# Patient Record
Sex: FEMALE | Race: WHITE | Employment: OTHER | URBAN - METROPOLITAN AREA
[De-identification: names, ages, dates, MRNs, and addresses within clinical notes are randomized per-mention and may not be internally consistent; named-entity substitution may affect disease eponyms.]

---

## 2017-05-11 ENCOUNTER — HOSPITAL ENCOUNTER (OUTPATIENT)
Dept: LAB | Age: 77
Discharge: HOME | End: 2017-05-11

## 2017-05-11 PROCEDURE — 88305 TISSUE EXAM BY PATHOLOGIST: CPT | Performed by: INTERNAL MEDICINE

## 2017-09-05 PROBLEM — R60.0 LOCALIZED EDEMA: Status: ACTIVE | Noted: 2017-09-05

## 2017-09-05 PROBLEM — R06.02 SHORTNESS OF BREATH: Status: ACTIVE | Noted: 2017-09-05

## 2017-11-22 PROBLEM — S02.2XXA CLOSED FRACTURE OF NASAL BONE: Status: ACTIVE | Noted: 2017-06-21

## 2017-11-22 PROBLEM — R06.02 SHORTNESS OF BREATH: Status: RESOLVED | Noted: 2017-09-05 | Resolved: 2017-11-22

## 2017-11-22 PROBLEM — R60.0 LOCALIZED EDEMA: Status: RESOLVED | Noted: 2017-09-05 | Resolved: 2017-11-22

## 2017-11-22 PROBLEM — S02.2XXA CLOSED FRACTURE OF NASAL BONE: Status: RESOLVED | Noted: 2017-06-21 | Resolved: 2017-11-22

## 2018-08-22 ENCOUNTER — HOSPITAL ENCOUNTER (OUTPATIENT)
Dept: LAB | Age: 78
Discharge: HOME | End: 2018-08-22

## 2018-08-22 PROCEDURE — 88305 TISSUE EXAM BY PATHOLOGIST: CPT

## 2019-05-30 PROBLEM — E04.2 MULTINODULAR GOITER: Status: ACTIVE | Noted: 2019-05-30

## 2019-07-25 PROBLEM — E78.00 HYPERCHOLESTEROLEMIA: Status: ACTIVE | Noted: 2018-03-05

## 2019-07-25 PROBLEM — I10 HYPERTENSIVE DISORDER: Status: ACTIVE | Noted: 2018-03-05

## 2019-08-28 DIAGNOSIS — E04.2 MULTINODULAR GOITER: Primary | ICD-10-CM

## 2019-09-03 ENCOUNTER — HOSPITAL ENCOUNTER (OUTPATIENT)
Dept: SURGERY | Age: 79
Discharge: HOME | End: 2019-09-03

## 2019-09-03 VITALS — WEIGHT: 149 LBS | HEIGHT: 65 IN | BODY MASS INDEX: 24.83 KG/M2

## 2019-09-03 NOTE — PERIOP NOTES
Patient verified name and . Order for consent found in EHR and matches case posting; patient verifies procedure. Type 2 surgery, phone assessment complete. Orders received. Labs per surgeon: none needed. Labs per anesthesia protocol: HGB, K+, Creatinine. Had BMP & CBC drawn on 19; results within anesthesia protocols and on chart for reference if needed. EKG: not needed per anesthesia protocols. [facility] cardiology office note (10/19/17), echo (17), ekg (17), and stress test (17) available in EHR for reference if needed. Patient answered medical/surgical history questions at their best of ability. All prior to admission medications documented in [facility]. Patient instructed to take the following medications the day of surgery according to anesthesia guidelines with a small sip of water: 81 mg aspirin, benzonatate, nexium. Hold all vitamins 7 days prior to surgery and NSAIDS 5 days prior to surgery. Prescription meds to hold: none. Instructed to bring dos: nexium, lovastatin. Patient instructed on the following:  Arrive at A Entrance, time of arrival to be called the day before by 1700  NPO after midnight including gum, mints, and ice chips  Responsible adult must drive patient to the hospital, stay during surgery, and patient will need supervision 24 hours after anesthesia  Use Hibiclens in shower the night before surgery and on the morning of surgery  All piercings must be removed prior to arrival.    Leave all valuables (money and jewelry) at home but bring insurance card and ID on DOS. Do not wear make-up, nail polish, lotions, cologne, perfumes, powders, or oil on skin. Patient teach back successful and patient demonstrates knowledge of instruction.

## 2019-09-08 ENCOUNTER — ANESTHESIA EVENT (OUTPATIENT)
Dept: SURGERY | Age: 79
End: 2019-09-08
Payer: MEDICARE

## 2019-09-08 RX ORDER — OXYCODONE HYDROCHLORIDE 5 MG/1
5 TABLET ORAL
Status: CANCELLED | OUTPATIENT
Start: 2019-09-08 | End: 2019-09-09

## 2019-09-08 RX ORDER — HYDROMORPHONE HYDROCHLORIDE 2 MG/ML
0.5 INJECTION, SOLUTION INTRAMUSCULAR; INTRAVENOUS; SUBCUTANEOUS
Status: CANCELLED | OUTPATIENT
Start: 2019-09-08

## 2019-09-08 RX ORDER — SODIUM CHLORIDE, SODIUM LACTATE, POTASSIUM CHLORIDE, CALCIUM CHLORIDE 600; 310; 30; 20 MG/100ML; MG/100ML; MG/100ML; MG/100ML
50 INJECTION, SOLUTION INTRAVENOUS CONTINUOUS
Status: CANCELLED | OUTPATIENT
Start: 2019-09-08

## 2019-09-08 RX ORDER — ALBUTEROL SULFATE 0.83 MG/ML
2.5 SOLUTION RESPIRATORY (INHALATION) AS NEEDED
Status: CANCELLED | OUTPATIENT
Start: 2019-09-08

## 2019-09-09 ENCOUNTER — ANESTHESIA (OUTPATIENT)
Dept: SURGERY | Age: 79
End: 2019-09-09
Payer: MEDICARE

## 2019-09-09 ENCOUNTER — HOSPITAL ENCOUNTER (OUTPATIENT)
Age: 79
Setting detail: OUTPATIENT SURGERY
Discharge: HOME | End: 2019-09-09
Attending: OTOLARYNGOLOGY | Admitting: OTOLARYNGOLOGY
Payer: MEDICARE

## 2019-09-09 VITALS
OXYGEN SATURATION: 96 % | BODY MASS INDEX: 24.58 KG/M2 | SYSTOLIC BLOOD PRESSURE: 185 MMHG | RESPIRATION RATE: 14 BRPM | TEMPERATURE: 97.8 F | HEART RATE: 69 BPM | DIASTOLIC BLOOD PRESSURE: 70 MMHG | WEIGHT: 147.7 LBS

## 2019-09-09 DIAGNOSIS — E04.2 MULTINODULAR GOITER: ICD-10-CM

## 2019-09-09 PROCEDURE — 77030021678 HC GLIDESCP STAT DISP VERT -B: Performed by: NURSE ANESTHETIST, CERTIFIED REGISTERED

## 2019-09-09 PROCEDURE — 74011250636 HC RX REV CODE- 250/636

## 2019-09-09 PROCEDURE — 77030016570 HC BLNKT BAIR HGGR 3M -B: Performed by: NURSE ANESTHETIST, CERTIFIED REGISTERED

## 2019-09-09 PROCEDURE — 74011000250 HC RX REV CODE- 250: Performed by: ANESTHESIOLOGY

## 2019-09-09 PROCEDURE — 74011250636 HC RX REV CODE- 250/636: Performed by: ANESTHESIOLOGY

## 2019-09-09 RX ORDER — LIDOCAINE HYDROCHLORIDE 10 MG/ML
0.1 INJECTION INFILTRATION; PERINEURAL AS NEEDED
Status: DISCONTINUED | OUTPATIENT
Start: 2019-09-09 | End: 2019-09-09 | Stop reason: HOSPADM

## 2019-09-09 RX ORDER — MIDAZOLAM HYDROCHLORIDE 1 MG/ML
2 INJECTION, SOLUTION INTRAMUSCULAR; INTRAVENOUS ONCE
Status: DISCONTINUED | OUTPATIENT
Start: 2019-09-09 | End: 2019-09-09 | Stop reason: HOSPADM

## 2019-09-09 RX ORDER — MIDAZOLAM HYDROCHLORIDE 1 MG/ML
2 INJECTION, SOLUTION INTRAMUSCULAR; INTRAVENOUS
Status: DISCONTINUED | OUTPATIENT
Start: 2019-09-09 | End: 2019-09-09 | Stop reason: HOSPADM

## 2019-09-09 RX ORDER — SODIUM CHLORIDE, SODIUM LACTATE, POTASSIUM CHLORIDE, CALCIUM CHLORIDE 600; 310; 30; 20 MG/100ML; MG/100ML; MG/100ML; MG/100ML
75 INJECTION, SOLUTION INTRAVENOUS CONTINUOUS
Status: DISCONTINUED | OUTPATIENT
Start: 2019-09-09 | End: 2019-09-09 | Stop reason: HOSPADM

## 2019-09-09 RX ORDER — FENTANYL CITRATE 50 UG/ML
100 INJECTION, SOLUTION INTRAMUSCULAR; INTRAVENOUS ONCE
Status: DISCONTINUED | OUTPATIENT
Start: 2019-09-09 | End: 2019-09-09 | Stop reason: HOSPADM

## 2019-09-09 RX ADMIN — LIDOCAINE HYDROCHLORIDE 0.1 ML: 10 INJECTION, SOLUTION INFILTRATION; PERINEURAL at 13:14

## 2019-09-09 RX ADMIN — SODIUM CHLORIDE, SODIUM LACTATE, POTASSIUM CHLORIDE, AND CALCIUM CHLORIDE 75 ML/HR: 600; 310; 30; 20 INJECTION, SOLUTION INTRAVENOUS at 13:14

## 2019-09-09 NOTE — ANESTHESIA PREPROCEDURE EVALUATION
Relevant Problems   No relevant active problems       Anesthetic History     PONV          Review of Systems / Medical History  Patient summary reviewed and pertinent labs reviewed    Pulmonary    COPD: mild               Neuro/Psych   Within defined limits           Cardiovascular    Hypertension: well controlled              Exercise tolerance: >4 METS     GI/Hepatic/Renal     GERD: well controlled           Endo/Other      Hypothyroidism  Arthritis     Other Findings              Physical Exam    Airway  Mallampati: III  TM Distance: 4 - 6 cm  Neck ROM: normal range of motion   Mouth opening: Normal     Cardiovascular    Rhythm: regular           Dental    Dentition: Caps/crowns and Bridges     Pulmonary                 Abdominal  GI exam deferred       Other Findings            Anesthetic Plan    ASA: 3  Anesthesia type: general          Induction: Intravenous  Anesthetic plan and risks discussed with: Patient      NIMS ETT, Glidescope for intubation

## 2019-09-11 DIAGNOSIS — E04.2 MULTINODULAR GOITER: Primary | ICD-10-CM

## 2019-09-17 ENCOUNTER — HOSPITAL ENCOUNTER (OUTPATIENT)
Dept: SURGERY | Age: 79
Discharge: HOME | End: 2019-09-17

## 2019-09-17 NOTE — PERIOP NOTES
Patient verified name and . Order for consent  found in EHR and matches case posting; patient verifies procedure. Type 2 surgery, abbreviated phone  assessment complete. Orders received. Pt reports she came for this surgery last week and surgery was cancelled due to illness of surgeon: she reports no changes in medical condition or medications since phone assessment 9-3-19. Labs per surgeon: none. Labs per anesthesia protocol: hgb, potassium, creatinine: pt has CBC, BMP noted in EMR dated 19-within anesthesia limits. Patient answered medical/surgical history questions at their best of ability. All prior to admission medications documented in [facility]. Patient instructed to take the following medications the day of surgery according to anesthesia guidelines with a small sip of water: nexium, aspirin 81 mg. Hold all vitamins 7 days prior to surgery and NSAIDS 5 days prior to surgery. Prescription meds to hold:none. Patient instructed on the following:  Arrive at A Entrance, time of arrival to be called the day before by 1700  NPO after midnight including gum, mints, and ice chips  Responsible adult must drive patient to the hospital, stay during surgery, and patient will need supervision 24 hours after anesthesia  Use hibiclens in shower the night before surgery and on the morning of surgery  All piercings must be removed prior to arrival.    Leave all valuables (money and jewelry) at home but bring insurance card and ID on       DOS. Do not wear make-up, nail polish, lotions, cologne, perfumes, powders, or oil on skin. Patient teach back successful and patient demonstrates knowledge of instruction.

## 2019-09-23 ENCOUNTER — ANESTHESIA EVENT (OUTPATIENT)
Dept: SURGERY | Age: 79
End: 2019-09-23
Payer: MEDICARE

## 2019-09-24 ENCOUNTER — ANESTHESIA (OUTPATIENT)
Dept: SURGERY | Age: 79
End: 2019-09-24
Payer: MEDICARE

## 2019-09-24 ENCOUNTER — HOSPITAL ENCOUNTER (OUTPATIENT)
Age: 79
Setting detail: OBSERVATION
Discharge: HOME | End: 2019-09-25
Attending: OTOLARYNGOLOGY | Admitting: OTOLARYNGOLOGY
Payer: MEDICARE

## 2019-09-24 DIAGNOSIS — E04.2 MULTINODULAR GOITER: ICD-10-CM

## 2019-09-24 PROBLEM — E89.0 S/P TOTAL THYROIDECTOMY: Status: ACTIVE | Noted: 2019-09-24

## 2019-09-24 LAB
CALCIUM SERPL-MCNC: 9.2 MG/DL (ref 8.3–10.4)
CALCIUM SERPL-MCNC: 9.3 MG/DL (ref 8.3–10.4)
CALCIUM SERPL-MCNC: 9.6 MG/DL (ref 8.3–10.4)
PTH-INTACT SERPL-MCNC: <6.3 PG/ML (ref 18.5–88)

## 2019-09-24 PROCEDURE — 77030002888 HC SUT CHRMC J&J -A: Performed by: OTOLARYNGOLOGY

## 2019-09-24 PROCEDURE — 77030019655 HC PRB STIM CRAN MEDT -B: Performed by: OTOLARYNGOLOGY

## 2019-09-24 PROCEDURE — 88307 TISSUE EXAM BY PATHOLOGIST: CPT

## 2019-09-24 PROCEDURE — 77030021678 HC GLIDESCP STAT DISP VERT -B: Performed by: ANESTHESIOLOGY

## 2019-09-24 PROCEDURE — 99218 HC RM OBSERVATION: CPT

## 2019-09-24 PROCEDURE — 76210000016 HC OR PH I REC 1 TO 1.5 HR: Performed by: OTOLARYNGOLOGY

## 2019-09-24 PROCEDURE — 77030002996 HC SUT SLK J&J -A: Performed by: OTOLARYNGOLOGY

## 2019-09-24 PROCEDURE — 74011000250 HC RX REV CODE- 250

## 2019-09-24 PROCEDURE — 76010000161 HC OR TIME 1 TO 1.5 HR INTENSV-TIER 1: Performed by: OTOLARYNGOLOGY

## 2019-09-24 PROCEDURE — 74011000250 HC RX REV CODE- 250: Performed by: ANESTHESIOLOGY

## 2019-09-24 PROCEDURE — 77030031753 HC SHR ENDO COAG HARM J&J -E: Performed by: OTOLARYNGOLOGY

## 2019-09-24 PROCEDURE — 77030032988 HC TU ET NIM TRIVNTG EMG MEDT -D: Performed by: OTOLARYNGOLOGY

## 2019-09-24 PROCEDURE — 82310 ASSAY OF CALCIUM: CPT

## 2019-09-24 PROCEDURE — 77030008477 HC STYL SATN SLP COVD -A: Performed by: ANESTHESIOLOGY

## 2019-09-24 PROCEDURE — 94760 N-INVAS EAR/PLS OXIMETRY 1: CPT

## 2019-09-24 PROCEDURE — 77030011267 HC ELECTRD BLD COVD -A: Performed by: OTOLARYNGOLOGY

## 2019-09-24 PROCEDURE — 74011000250 HC RX REV CODE- 250: Performed by: OTOLARYNGOLOGY

## 2019-09-24 PROCEDURE — 77030008703 HC TU ET UNCUF COVD -A: Performed by: ANESTHESIOLOGY

## 2019-09-24 PROCEDURE — 74011250636 HC RX REV CODE- 250/636

## 2019-09-24 PROCEDURE — 77010033678 HC OXYGEN DAILY

## 2019-09-24 PROCEDURE — 77030040356 HC CORD BPLR FRCP COVD -A: Performed by: OTOLARYNGOLOGY

## 2019-09-24 PROCEDURE — 83970 ASSAY OF PARATHORMONE: CPT

## 2019-09-24 PROCEDURE — 74011250637 HC RX REV CODE- 250/637: Performed by: OTOLARYNGOLOGY

## 2019-09-24 PROCEDURE — 77030014008 HC SPNG HEMSTAT J&J -C: Performed by: OTOLARYNGOLOGY

## 2019-09-24 PROCEDURE — 74011250636 HC RX REV CODE- 250/636: Performed by: ANESTHESIOLOGY

## 2019-09-24 PROCEDURE — 77030018836 HC SOL IRR NACL ICUM -A: Performed by: OTOLARYNGOLOGY

## 2019-09-24 PROCEDURE — 74011250636 HC RX REV CODE- 250/636: Performed by: OTOLARYNGOLOGY

## 2019-09-24 PROCEDURE — 77030002916 HC SUT ETHLN J&J -A: Performed by: OTOLARYNGOLOGY

## 2019-09-24 PROCEDURE — 36415 COLL VENOUS BLD VENIPUNCTURE: CPT

## 2019-09-24 PROCEDURE — 76060000034 HC ANESTHESIA 1.5 TO 2 HR: Performed by: OTOLARYNGOLOGY

## 2019-09-24 RX ORDER — DIPHENHYDRAMINE HYDROCHLORIDE 50 MG/ML
12.5 INJECTION, SOLUTION INTRAMUSCULAR; INTRAVENOUS
Status: DISCONTINUED | OUTPATIENT
Start: 2019-09-24 | End: 2019-09-24 | Stop reason: HOSPADM

## 2019-09-24 RX ORDER — PROPOFOL 10 MG/ML
INJECTION, EMULSION INTRAVENOUS AS NEEDED
Status: DISCONTINUED | OUTPATIENT
Start: 2019-09-24 | End: 2019-09-24 | Stop reason: HOSPADM

## 2019-09-24 RX ORDER — OXYCODONE HYDROCHLORIDE 5 MG/1
5 TABLET ORAL
Status: DISCONTINUED | OUTPATIENT
Start: 2019-09-24 | End: 2019-09-24 | Stop reason: HOSPADM

## 2019-09-24 RX ORDER — MIDAZOLAM HYDROCHLORIDE 1 MG/ML
2 INJECTION, SOLUTION INTRAMUSCULAR; INTRAVENOUS
Status: COMPLETED | OUTPATIENT
Start: 2019-09-24 | End: 2019-09-24

## 2019-09-24 RX ORDER — ONDANSETRON 2 MG/ML
4 INJECTION INTRAMUSCULAR; INTRAVENOUS
Status: DISCONTINUED | OUTPATIENT
Start: 2019-09-24 | End: 2019-09-25 | Stop reason: HOSPADM

## 2019-09-24 RX ORDER — SODIUM CHLORIDE, SODIUM LACTATE, POTASSIUM CHLORIDE, CALCIUM CHLORIDE 600; 310; 30; 20 MG/100ML; MG/100ML; MG/100ML; MG/100ML
100 INJECTION, SOLUTION INTRAVENOUS CONTINUOUS
Status: DISCONTINUED | OUTPATIENT
Start: 2019-09-24 | End: 2019-09-25 | Stop reason: HOSPADM

## 2019-09-24 RX ORDER — LIDOCAINE HYDROCHLORIDE 20 MG/ML
INJECTION, SOLUTION EPIDURAL; INFILTRATION; INTRACAUDAL; PERINEURAL AS NEEDED
Status: DISCONTINUED | OUTPATIENT
Start: 2019-09-24 | End: 2019-09-24 | Stop reason: HOSPADM

## 2019-09-24 RX ORDER — HYDROCODONE BITARTRATE AND ACETAMINOPHEN 5; 325 MG/1; MG/1
1 TABLET ORAL
Status: DISCONTINUED | OUTPATIENT
Start: 2019-09-24 | End: 2019-09-25 | Stop reason: HOSPADM

## 2019-09-24 RX ORDER — LIDOCAINE HYDROCHLORIDE AND EPINEPHRINE 10; 10 MG/ML; UG/ML
INJECTION, SOLUTION INFILTRATION; PERINEURAL AS NEEDED
Status: DISCONTINUED | OUTPATIENT
Start: 2019-09-24 | End: 2019-09-24 | Stop reason: HOSPADM

## 2019-09-24 RX ORDER — SUCCINYLCHOLINE CHLORIDE 20 MG/ML
INJECTION INTRAMUSCULAR; INTRAVENOUS AS NEEDED
Status: DISCONTINUED | OUTPATIENT
Start: 2019-09-24 | End: 2019-09-24 | Stop reason: HOSPADM

## 2019-09-24 RX ORDER — FENTANYL CITRATE 50 UG/ML
INJECTION, SOLUTION INTRAMUSCULAR; INTRAVENOUS AS NEEDED
Status: DISCONTINUED | OUTPATIENT
Start: 2019-09-24 | End: 2019-09-24 | Stop reason: HOSPADM

## 2019-09-24 RX ORDER — HYDROMORPHONE HYDROCHLORIDE 2 MG/ML
0.2 INJECTION, SOLUTION INTRAMUSCULAR; INTRAVENOUS; SUBCUTANEOUS
Status: DISCONTINUED | OUTPATIENT
Start: 2019-09-24 | End: 2019-09-24 | Stop reason: HOSPADM

## 2019-09-24 RX ORDER — ACETAMINOPHEN 10 MG/ML
INJECTION, SOLUTION INTRAVENOUS AS NEEDED
Status: DISCONTINUED | OUTPATIENT
Start: 2019-09-24 | End: 2019-09-24 | Stop reason: HOSPADM

## 2019-09-24 RX ORDER — LIDOCAINE HYDROCHLORIDE 10 MG/ML
0.1 INJECTION INFILTRATION; PERINEURAL AS NEEDED
Status: DISCONTINUED | OUTPATIENT
Start: 2019-09-24 | End: 2019-09-24 | Stop reason: HOSPADM

## 2019-09-24 RX ORDER — SODIUM CHLORIDE, SODIUM LACTATE, POTASSIUM CHLORIDE, CALCIUM CHLORIDE 600; 310; 30; 20 MG/100ML; MG/100ML; MG/100ML; MG/100ML
75 INJECTION, SOLUTION INTRAVENOUS CONTINUOUS
Status: DISCONTINUED | OUTPATIENT
Start: 2019-09-24 | End: 2019-09-24 | Stop reason: HOSPADM

## 2019-09-24 RX ORDER — SODIUM CHLORIDE, SODIUM LACTATE, POTASSIUM CHLORIDE, CALCIUM CHLORIDE 600; 310; 30; 20 MG/100ML; MG/100ML; MG/100ML; MG/100ML
100 INJECTION, SOLUTION INTRAVENOUS CONTINUOUS
Status: DISCONTINUED | OUTPATIENT
Start: 2019-09-24 | End: 2019-09-24 | Stop reason: HOSPADM

## 2019-09-24 RX ORDER — ONDANSETRON 2 MG/ML
INJECTION INTRAMUSCULAR; INTRAVENOUS AS NEEDED
Status: DISCONTINUED | OUTPATIENT
Start: 2019-09-24 | End: 2019-09-24 | Stop reason: HOSPADM

## 2019-09-24 RX ORDER — NALOXONE HYDROCHLORIDE 0.4 MG/ML
0.4 INJECTION, SOLUTION INTRAMUSCULAR; INTRAVENOUS; SUBCUTANEOUS AS NEEDED
Status: DISCONTINUED | OUTPATIENT
Start: 2019-09-24 | End: 2019-09-25 | Stop reason: HOSPADM

## 2019-09-24 RX ORDER — MORPHINE SULFATE 2 MG/ML
2 INJECTION, SOLUTION INTRAMUSCULAR; INTRAVENOUS
Status: DISCONTINUED | OUTPATIENT
Start: 2019-09-24 | End: 2019-09-25 | Stop reason: HOSPADM

## 2019-09-24 RX ORDER — DEXAMETHASONE SODIUM PHOSPHATE 4 MG/ML
INJECTION, SOLUTION INTRA-ARTICULAR; INTRALESIONAL; INTRAMUSCULAR; INTRAVENOUS; SOFT TISSUE AS NEEDED
Status: DISCONTINUED | OUTPATIENT
Start: 2019-09-24 | End: 2019-09-24 | Stop reason: HOSPADM

## 2019-09-24 RX ORDER — PHENYLEPHRINE 40 MG/250 ML (160 MCG/ML) IN 0.9 % SODIUM CHLORIDE IV
SOLUTION
Status: DISCONTINUED | OUTPATIENT
Start: 2019-09-24 | End: 2019-09-24 | Stop reason: HOSPADM

## 2019-09-24 RX ORDER — FLUMAZENIL 0.1 MG/ML
0.2 INJECTION INTRAVENOUS
Status: DISCONTINUED | OUTPATIENT
Start: 2019-09-24 | End: 2019-09-24 | Stop reason: HOSPADM

## 2019-09-24 RX ORDER — AMOXICILLIN 500 MG/1
500 CAPSULE ORAL EVERY 12 HOURS
Status: DISCONTINUED | OUTPATIENT
Start: 2019-09-24 | End: 2019-09-25 | Stop reason: HOSPADM

## 2019-09-24 RX ORDER — NALOXONE HYDROCHLORIDE 0.4 MG/ML
0.1 INJECTION, SOLUTION INTRAMUSCULAR; INTRAVENOUS; SUBCUTANEOUS AS NEEDED
Status: DISCONTINUED | OUTPATIENT
Start: 2019-09-24 | End: 2019-09-24 | Stop reason: HOSPADM

## 2019-09-24 RX ORDER — PROPOFOL 10 MG/ML
VIAL (ML) INTRAVENOUS
Status: DISCONTINUED | OUTPATIENT
Start: 2019-09-24 | End: 2019-09-24 | Stop reason: HOSPADM

## 2019-09-24 RX ORDER — SODIUM CHLORIDE, SODIUM LACTATE, POTASSIUM CHLORIDE, CALCIUM CHLORIDE 600; 310; 30; 20 MG/100ML; MG/100ML; MG/100ML; MG/100ML
INJECTION, SOLUTION INTRAVENOUS
Status: DISCONTINUED | OUTPATIENT
Start: 2019-09-24 | End: 2019-09-24 | Stop reason: HOSPADM

## 2019-09-24 RX ADMIN — PHENYLEPHRINE 40 MG/250 ML (160 MCG/ML) IN 0.9 % SODIUM CHLORIDE IV 25 MCG/MIN: SOLUTION at 14:30

## 2019-09-24 RX ADMIN — HYDROMORPHONE HYDROCHLORIDE 0.2 MG: 2 INJECTION INTRAMUSCULAR; INTRAVENOUS; SUBCUTANEOUS at 16:07

## 2019-09-24 RX ADMIN — Medication 25 MCG/KG/MIN: at 14:30

## 2019-09-24 RX ADMIN — SODIUM CHLORIDE, SODIUM LACTATE, POTASSIUM CHLORIDE, CALCIUM CHLORIDE: 600; 310; 30; 20 INJECTION, SOLUTION INTRAVENOUS at 14:30

## 2019-09-24 RX ADMIN — MIDAZOLAM 2 MG: 1 INJECTION INTRAMUSCULAR; INTRAVENOUS at 13:04

## 2019-09-24 RX ADMIN — SODIUM CHLORIDE, SODIUM LACTATE, POTASSIUM CHLORIDE, AND CALCIUM CHLORIDE 100 ML/HR: 600; 310; 30; 20 INJECTION, SOLUTION INTRAVENOUS at 09:50

## 2019-09-24 RX ADMIN — LIDOCAINE HYDROCHLORIDE 0.1 ML: 10 INJECTION, SOLUTION INFILTRATION; PERINEURAL at 09:50

## 2019-09-24 RX ADMIN — HYDROCODONE BITARTRATE AND ACETAMINOPHEN 1 TABLET: 5; 325 TABLET ORAL at 22:47

## 2019-09-24 RX ADMIN — MORPHINE SULFATE 2 MG: 2 INJECTION, SOLUTION INTRAMUSCULAR; INTRAVENOUS at 17:30

## 2019-09-24 RX ADMIN — SODIUM CHLORIDE, SODIUM LACTATE, POTASSIUM CHLORIDE, AND CALCIUM CHLORIDE: 600; 310; 30; 20 INJECTION, SOLUTION INTRAVENOUS at 14:10

## 2019-09-24 RX ADMIN — FENTANYL CITRATE 50 MCG: 50 INJECTION, SOLUTION INTRAMUSCULAR; INTRAVENOUS at 14:25

## 2019-09-24 RX ADMIN — DEXAMETHASONE SODIUM PHOSPHATE 4 MG: 4 INJECTION, SOLUTION INTRA-ARTICULAR; INTRALESIONAL; INTRAMUSCULAR; INTRAVENOUS; SOFT TISSUE at 14:37

## 2019-09-24 RX ADMIN — ONDANSETRON 4 MG: 2 INJECTION INTRAMUSCULAR; INTRAVENOUS at 14:37

## 2019-09-24 RX ADMIN — HYDROMORPHONE HYDROCHLORIDE 0.2 MG: 2 INJECTION INTRAMUSCULAR; INTRAVENOUS; SUBCUTANEOUS at 16:22

## 2019-09-24 RX ADMIN — LIDOCAINE HYDROCHLORIDE 100 MG: 20 INJECTION, SOLUTION EPIDURAL; INFILTRATION; INTRACAUDAL; PERINEURAL at 14:25

## 2019-09-24 RX ADMIN — HYDROMORPHONE HYDROCHLORIDE 0.2 MG: 2 INJECTION INTRAMUSCULAR; INTRAVENOUS; SUBCUTANEOUS at 16:17

## 2019-09-24 RX ADMIN — SUCCINYLCHOLINE CHLORIDE 140 MG: 20 INJECTION INTRAMUSCULAR; INTRAVENOUS at 14:25

## 2019-09-24 RX ADMIN — AMOXICILLIN 500 MG: 500 CAPSULE ORAL at 22:02

## 2019-09-24 RX ADMIN — HYDROMORPHONE HYDROCHLORIDE 0.2 MG: 2 INJECTION INTRAMUSCULAR; INTRAVENOUS; SUBCUTANEOUS at 16:12

## 2019-09-24 RX ADMIN — SODIUM CHLORIDE, SODIUM LACTATE, POTASSIUM CHLORIDE, AND CALCIUM CHLORIDE 100 ML/HR: 600; 310; 30; 20 INJECTION, SOLUTION INTRAVENOUS at 18:00

## 2019-09-24 RX ADMIN — FENTANYL CITRATE 50 MCG: 50 INJECTION, SOLUTION INTRAMUSCULAR; INTRAVENOUS at 14:36

## 2019-09-24 RX ADMIN — ACETAMINOPHEN 1000 MG: 10 INJECTION, SOLUTION INTRAVENOUS at 15:30

## 2019-09-24 RX ADMIN — PROPOFOL 160 MG: 10 INJECTION, EMULSION INTRAVENOUS at 14:25

## 2019-09-24 NOTE — PROGRESS NOTES
Pt assessment completed. Alert and oriented. Lungs CTA even and unlabored. Heart sounds regular. Abdomen soft and non tender with active bowel sounds. IV present and infusing without difficulty. Incision present to neck, clean dry and intact. Pt oriented to room, aware to call for assistance.

## 2019-09-24 NOTE — ANESTHESIA PREPROCEDURE EVALUATION
Relevant Problems   No relevant active problems       Anesthetic History     PONV          Review of Systems / Medical History  Patient summary reviewed and pertinent labs reviewed    Pulmonary    COPD: mild               Neuro/Psych   Within defined limits           Cardiovascular    Hypertension: well controlled              Exercise tolerance: >4 METS     GI/Hepatic/Renal     GERD: well controlled           Endo/Other      Hypothyroidism  Arthritis     Other Findings   Comments: Hb 11.1         Physical Exam    Airway  Mallampati: III  TM Distance: 4 - 6 cm  Neck ROM: normal range of motion   Mouth opening: Normal     Cardiovascular    Rhythm: regular           Dental    Dentition: Caps/crowns and Bridges     Pulmonary                 Abdominal  GI exam deferred       Other Findings            Anesthetic Plan    ASA: 3  Anesthesia type: general  ETT, NIMS  Glidescope        Induction: Intravenous  Anesthetic plan and risks discussed with: Patient

## 2019-09-24 NOTE — PROGRESS NOTES
09/24/19 1741   Oxygen Therapy   O2 Sat (%) 98 %   Pulse via Oximetry 71 beats per minute   O2 Device Nasal cannula  (weaned to room air)   O2 Flow Rate (L/min) 2 l/min

## 2019-09-24 NOTE — PROGRESS NOTES
09/24/19 1719   Dual Skin Pressure Injury Assessment   Dual Skin Pressure Injury Assessment X   Second Care Provider (Based on [address] [place] [address]) Tan TOM  (neck incision open to air)

## 2019-09-24 NOTE — PROGRESS NOTES
TRANSFER - IN REPORT:    Verbal report received from [facility]) on [place]  being received from [facility]) for routine post - op      Report consisted of patients Situation, Background, Assessment and   Recommendations(SBAR). Information from the following report(s) SBAR and Kardex was reviewed with the receiving nurse. Opportunity for questions and clarification was provided. Assessment completed upon patients arrival to unit and care assumed.

## 2019-09-24 NOTE — ANESTHESIA POSTPROCEDURE EVALUATION
Procedure(s):  THYROIDECTOMY. general    Anesthesia Post Evaluation      Multimodal analgesia: multimodal analgesia used between 6 hours prior to anesthesia start to PACU discharge  Patient location during evaluation: PACU  Patient participation: complete - patient participated  Level of consciousness: awake  Pain management: adequate  Airway patency: patent  Anesthetic complications: no  Cardiovascular status: acceptable and hemodynamically stable  Respiratory status: acceptable  Hydration status: acceptable  Comments: Acceptable for discharge from PACU.   Post anesthesia nausea and vomiting:  none      Vitals Value Taken Time   /80 9/24/2019  4:26 PM   Temp     Pulse 74 9/24/2019  4:44 PM   Resp 16 9/24/2019  4:44 PM   SpO2 100 % 9/24/2019  4:44 PM

## 2019-09-25 VITALS
RESPIRATION RATE: 16 BRPM | SYSTOLIC BLOOD PRESSURE: 133 MMHG | HEART RATE: 77 BPM | TEMPERATURE: 97.7 F | BODY MASS INDEX: 24.79 KG/M2 | OXYGEN SATURATION: 98 % | DIASTOLIC BLOOD PRESSURE: 64 MMHG | WEIGHT: 149 LBS

## 2019-09-25 LAB
CALCIUM SERPL-MCNC: 8.9 MG/DL (ref 8.3–10.4)
CALCIUM SERPL-MCNC: 9.1 MG/DL (ref 8.3–10.4)
CALCIUM SERPL-MCNC: 9.2 MG/DL (ref 8.3–10.4)

## 2019-09-25 PROCEDURE — 82310 ASSAY OF CALCIUM: CPT

## 2019-09-25 PROCEDURE — 74011250637 HC RX REV CODE- 250/637: Performed by: OTOLARYNGOLOGY

## 2019-09-25 PROCEDURE — 36415 COLL VENOUS BLD VENIPUNCTURE: CPT

## 2019-09-25 PROCEDURE — 74011250636 HC RX REV CODE- 250/636: Performed by: OTOLARYNGOLOGY

## 2019-09-25 PROCEDURE — 99218 HC RM OBSERVATION: CPT

## 2019-09-25 RX ADMIN — HYDROCHLOROTHIAZIDE: 25 TABLET ORAL at 08:35

## 2019-09-25 RX ADMIN — AMOXICILLIN 500 MG: 500 CAPSULE ORAL at 08:35

## 2019-09-25 RX ADMIN — SODIUM CHLORIDE, SODIUM LACTATE, POTASSIUM CHLORIDE, AND CALCIUM CHLORIDE 100 ML/HR: 600; 310; 30; 20 INJECTION, SOLUTION INTRAVENOUS at 04:09

## 2019-09-25 NOTE — DISCHARGE INSTRUCTIONS
DISCHARGE SUMMARY from Nurse    PATIENT INSTRUCTIONS:    After general anesthesia or intravenous sedation, for 24 hours or while taking prescription Narcotics:  · Limit your activities  · Do not drive and operate hazardous machinery  · Do not make important personal or business decisions  · Do  not drink alcoholic beverages  · If you have not urinated within 8 hours after discharge, please contact your surgeon on call. Report the following to your surgeon:  · Excessive pain, swelling, redness or odor of or around the surgical area  · Temperature over 100.5  · Nausea and vomiting lasting longer than 4 hours or if unable to take medications  · Any signs of decreased circulation or nerve impairment to extremity: change in color, persistent  numbness, tingling, coldness or increase pain  · Any questions    What to do at Home:  Recommended activity: Activity as tolerated, keep wound clean and dry, no heavy lifting, no driving while taking narcotics, follow up with Dr. Amna Lantigua as scheduled, PRN pain medication, PRN nausea medication, antibiotic if prescribed, start Thyroid medication if prescribed. Continue other home medications as directed by surgeon. If you experience any of the following symptoms fever, chills, s/s of wound infection, severe pain, nausea, vomiting, any other concerns, please follow up with Dr. Amna Lantigua. *  Please give a list of your current medications to your Primary Care Provider. *  Please update this list whenever your medications are discontinued, doses are      changed, or new medications (including over-the-counter products) are added. *  Please carry medication information at all times in case of emergency situations. These are general instructions for a healthy lifestyle:    No smoking/ No tobacco products/ Avoid exposure to second hand smoke  Surgeon General's Warning:  Quitting smoking now greatly reduces serious risk to your health.     Obesity, smoking, and sedentary lifestyle greatly increases your risk for illness    A healthy diet, regular physical exercise & weight monitoring are important for maintaining a healthy lifestyle    You may be retaining fluid if you have a history of heart failure or if you experience any of the following symptoms:  Weight gain of 3 pounds or more overnight or 5 pounds in a week, increased swelling in our hands or feet or shortness of breath while lying flat in bed. Please call your doctor as soon as you notice any of these symptoms; do not wait until your next office visit. The discharge information has been reviewed with the patient. The patient verbalized understanding. Discharge medications reviewed with the patient and appropriate educational materials and side effects teaching were provided. ___________________________________________________________________________________________________________________________________  Patient Education        Thyroidectomy: What to Expect at Home  Your Recovery    Thyroidectomy is the removal of the thyroid gland, which is shaped like a butterfly and lies across the windpipe (trachea). The gland makes hormones that control how your body makes and uses energy (metabolism). A doctor removes the gland when a tumor is present. The doctor may also remove the gland if you have an enlarged thyroid that is causing symptoms that bother you. Most tumors that grow in the thyroid gland are benign, meaning they are not cancer. You may leave the hospital with stitches in the cut (incision) the doctor made. Your doctor will tell you if you need to come back to have these removed. You may still have a tube called a drain in your neck. Your doctor will take this out a few days after your surgery. You may have some trouble chewing and swallowing after you go home. Your voice probably will be hoarse, and you may have trouble talking.  For most people, these problems get better within 3 to 4 months, but it can take as long as a year. In some cases, this surgery causes permanent problems with chewing, speaking, or swallowing. This care sheet gives you a general idea about how long it will take for you to recover. But each person recovers at a different pace. Follow the steps below to get better as quickly as possible. How can you care for yourself at home? Activity    · Rest when you feel tired. Getting enough sleep will help you recover. When you lie down, put two or three pillows under your head to keep it raised. · Try to walk each day. Start by walking a little more than you did the day before. Bit by bit, increase the amount you walk. Walking boosts blood flow and helps prevent pneumonia and constipation. · Avoid strenuous physical activity and lifting heavy objects for 3 weeks after surgery or until your doctor says it is okay. · Do not over-extend your neck backwards for 2 weeks after surgery. · Ask your doctor when you can drive again. · You may take a shower, unless you still have a drain near your incision. Pat the incision dry. If you have a drain, follow your doctor's instructions to care for it. Diet    · If it is painful to swallow, start out with cold drinks, flavored ice pops, and ice cream. Next, try soft foods like pudding, yogurt, canned or cooked fruit, scrambled eggs, and mashed potatoes. Avoid eating hard or scratchy foods like chips or raw vegetables. Avoid orange or tomato juice and other acidic foods that can sting the throat. · If you cough right after drinking, try drinking thicker liquids, such as a smoothie. · You may notice that your bowel movements are not regular right after your surgery. This is common. Try to avoid constipation and straining with bowel movements. You may want to take a fiber supplement every day. If you have not had a bowel movement after a couple of days, ask your doctor about taking a mild laxative.    Medicines    · Your doctor will tell you if and when you can restart your medicines. He or she will also give you instructions about taking any new medicines. · If you take blood thinners, such as warfarin (Coumadin), clopidogrel (Plavix), or aspirin, be sure to talk to your doctor. He or she will tell you if and when to start taking those medicines again. Make sure that you understand exactly what your doctor wants you to do. · Be safe with medicines. Take pain medicines exactly as directed. ? If the doctor gave you a prescription medicine for pain, take it as prescribed. ? If you are not taking a prescription pain medicine, ask your doctor if you can take an over-the-counter medicine. · If you think your pain medicine is making you sick to your stomach:  ? Take your medicine after meals (unless your doctor has told you not to). ? Ask your doctor for a different pain medicine. · Your doctor may prescribe calcium to prevent problems after surgery from low calcium. Not having enough calcium can cause symptoms such as tingling around your mouth or in your hands and feet. · Your doctor may have prescribed antibiotics. Take them as directed. Do not stop taking them just because you feel better. You need to take the full course of antibiotics. Incision care    · If your doctor told you how to care for your incision, follow your doctor's instructions. If you did not get instructions, follow this general advice:  ? After the first 24 to 48 hours, wash around the wound with clean water 2 times a day. Don't use hydrogen peroxide or alcohol, which can slow healing. · You may have a drain near your incision. Your doctor will tell you how to take care of it. Follow-up care is a key part of your treatment and safety. Be sure to make and go to all appointments, and call your doctor if you are having problems. It's also a good idea to know your test results and keep a list of the medicines you take. When should you call for help?   Call 911 anytime you think you may need emergency care. For example, call if:    · You passed out (lost consciousness). · You have sudden chest pain and shortness of breath, or you cough up blood. · You have severe trouble breathing. Call your doctor now or seek immediate medical care if:    · You have loose stitches, or your incision comes open. · Bleeding from your incision soaks through your bandages. · You have signs of infection, such as:  ? Increased pain, swelling, warmth, or redness. ? Red streaks leading from the incision. ? Pus draining from the incision. ? A fever. · You have a tingling feeling around your mouth. · You have cramping or tingling in your hands and feet. Watch closely for changes in your health, and be sure to contact your doctor if:    · You have trouble talking. · You are sick to your stomach or cannot keep fluids down. · You do not have a bowel movement after taking a laxative. Where can you learn more? Go to http://arley-tanja.info/. Enter [id number] in the search box to learn more about \"Thyroidectomy: What to Expect at Home. \"  Current as of: November 6, 2018  Content Version: 12.1  © 4615-7500 [employer]. Care instructions adapted under license by [employer] (which disclaims liability or warranty for this information). If you have questions about a medical condition or this instruction, always ask your healthcare professional. [place] [address] any warranty or liability for your use of this information.

## 2019-09-25 NOTE — PROGRESS NOTES
Problem: Falls - Risk of  Goal: *Absence of Falls  Description  Document Candida Lightning Fall Risk and appropriate interventions in the flowsheet.   Outcome: Progressing Towards Goal  Note:   Fall Risk Interventions:            Medication Interventions: Patient to call before getting OOB, Teach patient to arise slowly                   Problem: Patient Education: Go to Patient Education Activity  Goal: Patient/Family Education  Outcome: Progressing Towards Goal

## 2019-09-27 NOTE — OP NOTES
[id number] [address]  OPERATIVE REPORT    Name:  Donna Perry  MR#:  [id number]  :  1940  ACCOUNT #:  [de-identified]  DATE OF SERVICE:  2019    PREOPERATIVE DIAGNOSIS:  Multiple thyroid nodules, suspicious for carcinoma. POSTOPERATIVE DIAGNOSIS:  Multiple thyroid nodules, suspicious for carcinoma. PROCEDURE PERFORMED:  Total thyroidectomy with nerve integrity monitoring. SURGEON:  Alessio Solis DO    ASSISTANT:  None. ANESTHESIA:  General.    COMPLICATIONS:  None. SPECIMENS REMOVED:  Left and right thyroid. IMPLANTS:  None. ESTIMATED BLOOD LOSS:  10 mL. HISTORY:  A 74-year-old female. She came to see me in the office as a new patient because of thyroid nodules. She went to see Dr. Christianne Matthews because of thyroid nodules. He did an ultrasound and she had 2-3 cm nodules. He did do a biopsy in the office. Initial pathology specimen was indeterminate, so it was sent for genetic testing and this did come back with a 50% chance of malignancy, so she was referred over for total thyroidectomy. She is not having problems with hoarseness. There is no dysphagia. She is really pretty symptom-free, but given the nature of the biopsy, it was my recommendation that she undergo a total thyroidectomy, possible central neck dissection. Procedure risks and benefits were discussed with her in the office. All questions were answered. She is agreeable to the surgery. The scope was done in the office, which showed good true vocal cord movement bilaterally. SURGERY DETAILS:  The patient was identified in the preoperative waiting area. She was taken back to the operating room, where she underwent general anesthesia. A nerve integrity monitoring endotracheal tube was used for the intubation. The needles for the probe were placed in the anterior chest wall. The patient was prepped and draped in sterile fashion.   I was able to locate a prominent neck crease in the lower part of the neck about a centimeter below the level of the cricoid cartilage. I was able to cinthya this out on the skin while drawing out the other landmarks in the anterior neck. Once I had the line drawn, which was approximately 3-4 cm in length, I injected less than 1 mL 1% lidocaine with epinephrine below the predrawn line. After a few minutes, a knife was used to make an incision through the predrawn line. This was taken down through the subcutaneous fat. I was able to identify the platysma. Superior and inferior subplatysmal flaps were raised. Stay sutures were used in the four corners to hold it open. The strap muscles were then split in the midline vertically. This was taken down to the level of the thyroid gland. I did start on the left. I was able to use blunt dissection and get around the gland pretty easily going laterally. I was able to immediately identify the recurrent laryngeal nerve, which was traced out to the posterior portion of the thyroid gland and then I was able to identify superior, middle, and inferior vessels and using a harmonic scalpel, I was able to ligate all of those. I was able to retract the superior and inferior parathyroid glands laterally off the gland. They appeared to be healthy still at the end of the case. The left portion of the gland was taken over the midline of the anterior portion of the trachea and then split. At that point, I did use nerve integrity monitoring probe to test the recurrent laryngeal nerve on the left and there was a very good response the full length of the nerve. Going to the right side, then using blunt dissection, again going laterally, I was able to identify again all the vessels, superior, middle, and inferior. They were all ligated using the harmonic scalpel.   When posteriorly around the gland, I was able to locate the recurrent laryngeal nerve, traced it out,  this from the gland and retracted the rest of the gland towards the anterior portion of the trachea and was completely removed. Again, the parathyroid glands were preserved, appeared to be healthy at the end of the case and also retesting of both recurrent laryngeal nerves showed a good response. There was minimal bleeding from the area, so a 4-0 chromic was used to reapproximate the platysma, 5-0 nylon was used to reapproximate the skin. Antibiotic ointment was placed over the incision. Prior to complete closure, I did palpate the rest of the neck and there was no obvious sign of lymphadenopathy or any other abnormalities in the neck. The patient was then awakened and she was taken to the postop recovery room in stable condition.       Lester Beckwith DO      [id number]  D:  09/27/2019 8:13  T:  09/27/2019 15:35  JOB #:  [id number]

## 2019-12-02 PROBLEM — I95.9 LOW BLOOD PRESSURE: Status: ACTIVE | Noted: 2019-12-02

## 2019-12-02 PROBLEM — E89.0 POSTSURGICAL HYPOTHYROIDISM: Status: ACTIVE | Noted: 2019-05-30

## 2020-12-21 PROBLEM — M20.12 HALLUX VALGUS (ACQUIRED), LEFT FOOT: Status: ACTIVE | Noted: 2020-12-21

## 2020-12-21 PROBLEM — M20.42 OTHER HAMMER TOE(S) (ACQUIRED), LEFT FOOT: Status: ACTIVE | Noted: 2020-12-21

## 2022-01-03 ENCOUNTER — HOSPITAL ENCOUNTER (OUTPATIENT)
Dept: LAB | Age: 82
Discharge: HOME | End: 2022-01-03

## 2022-01-03 PROCEDURE — 88305 TISSUE EXAM BY PATHOLOGIST: CPT

## 2022-01-10 ENCOUNTER — HOSPITAL ENCOUNTER (OUTPATIENT)
Dept: GENERAL RADIOLOGY | Age: 82
Discharge: HOME | End: 2022-01-10
Payer: MEDICARE

## 2022-01-10 DIAGNOSIS — R06.02 SOB (SHORTNESS OF BREATH): ICD-10-CM

## 2022-01-10 PROCEDURE — 71046 X-RAY EXAM CHEST 2 VIEWS: CPT

## 2022-01-18 ENCOUNTER — HOSPITAL ENCOUNTER (OUTPATIENT)
Dept: PHYSICAL THERAPY | Age: 82
Discharge: HOME | End: 2022-01-18
Payer: MEDICARE

## 2022-01-19 NOTE — PROGRESS NOTES
Ernst Puckett  : 1940  [place]  Secondary: Kristopher Cooney at [address]  [facility], [address], [place] [address]  [phone]   [phone]      OUTPATIENT DAILY NOTE    NAME/AGE/GENDER: Ernst Puckett is a 80 y.o. female. DATE: 2022    Ms. Nunn Limchristine for today's appointment due to due to inclement weather.     Solomon Garg, PT   Future Appointments   Date Time Provider [facility]   [facility]   2022 11:15 AM Stephanie Rey MD [facility] FIM   2022 11:15 AM FIM [facility]   3/29/2022 11:15 AM [address] THRU [facility] IMD IMD   2022 11:15 AM PP PFT LAB [facility] PP PP   2022  2:50 PM Caprice Abreu NP [facility] PP PP

## 2022-01-26 ENCOUNTER — HOSPITAL ENCOUNTER (OUTPATIENT)
Dept: PHYSICAL THERAPY | Age: 82
Discharge: HOME | End: 2022-01-26
Payer: MEDICARE

## 2022-01-26 PROCEDURE — 97110 THERAPEUTIC EXERCISES: CPT

## 2022-01-26 PROCEDURE — 97161 PT EVAL LOW COMPLEX 20 MIN: CPT

## 2022-01-26 NOTE — THERAPY EVALUATION
Everardo Payment  : 1940  [place] / [place] / Product Type: Medicare /    [address]  at [facility]  [address] , [address], [place] [address]  [phone]   [phone]         OUTPATIENT PHYSICAL THERAPY:Initial Assessment 2022    ICD-10: Treatment Diagnosis: M62.81 Muscle weakness (generalized); R26.2 Difficulty in walking; R26.81 Unsteadiness on feet  Precautions/Allergies:   Sulfa (sulfonamide antibiotics)   TREATMENT PLAN:  Effective Dates: 2022 TO 3/27/2022 (60 days). Frequency/Duration: 1 time a week for 60 Day(s) MEDICAL/REFERRING DIAGNOSIS:  Post covid-19 condition, unspecified [U09.9]   DATE OF ONSET: 2021  REFERRING PHYSICIAN: Cosmo Phan NP MD Orders: PT eval and tx   Return MD Appointment: 22     INITIAL ASSESSMENT:  Ms. Sood presents to PT stephanieal s/p bout with COVID 19. Prior to [facility] she would play tennis without any SOB and walk her dog without any SOB. Currently, her oxygen drops with exertion, but it is improving as she has begun to exercise again. With testing, she was found to be a fall risk with a score below 80% on the ABC scale and she displayed decreased functional capacity with her 2 minute step in place score of 77 repetitions. With testing today, her oxygen ranged from 93-98% and her SOB ranged from 0/10 to 9/10. Ms. Sood will benefit from continued skilled PT services to improve deficits listed below and to progress towards their PLOF. PROBLEM LIST (Impacting functional limitations):  1. Decreased Strength  2. Decreased ADL/Functional Activities  3. Decreased Ambulation Ability/Technique  4. Decreased Balance  5. Decreased Activity Tolerance  6. Decreased Pacing Skills  7. Decreased Work Simplification/Energy Conservation Techniques  8. Increased Fatigue  9. Increased Shortness of Breath  10.  Decreased Alicia with Home Exercise Program INTERVENTIONS PLANNED:  1. Balance Exercise  2. Home Exercise Program (HEP)  3. Range of Motion (ROM)  4. Therapeutic Activites  5. Therapeutic Exercise/Strengthening   GOALS: (Goals have been discussed and agreed upon with patient.)  Short-Term Functional Goals: Time Frame: 3 weeks  1. Pt will be independent w/ HEP in order to improve outcomes and decrease pain. 2. Pt will have TUG score of 6.7 seconds or less displaying improved functional capacity and balance. 3. Pt will perform >5 continuous minutes of standing dynamic balance w/o LOB or use of UE assist.   Discharge Goals: Time Frame: 4 weeks  1. Pt will have 30 second chair sit to stand score of 15 or greater displaying improved functional capacity and balance. 2. Pt will have 2 minutes step in place score of 100 or greater displaying improved functional capacity and strength. 3. Pt will have 4+/5 B LE strength in order to safely perform daily functional activities. 4. Pt will have ABC score of 80% or greater in order to show improved balance and safety. Outcome Measure: 2 Minute Step in Place Test    Score:  Initial: 77 steps Most Recent: X steps (Date: -- )   Interpretation of Score: See below for range of scores between 25% and 75% percentiles                  Baseline End of Test   Heart Rate 155/75  190/79   Dyspnea (Terrance Scale)  2/10  9/10   Fatigue (Terrance Scale)  0/10  9/10   SpO2  97%  93%   BP  75  109                       Age Men Women   60-64     65-70     70-74     75-79     80-84  60-90   85-89 59-91 55-85   90-94 52-86 44-72      Tool Used: Activities-Specific Balance Confidence (ABC) Scale  Score:  Initial: 76.875 % Confidence Most Recent: X% Confidence (Date: -- )   Interpretation of Score: The test rates the patients percentage of confidence with functional daily activities from 0%, indicating no confidence, to 100%, indicating complete confidence. The percentages are added together and then averaged.  If the average is less than 80%, this indicates significant impairment with daily activities. Tool Used: 30 sec sit to stand  Score:  Initial: 13x, 95% 98 HR Most Recent: X (Date: -- )   Interpretation of Score: A below average score indicates a risk for falls  BELOW AVERAGE SCORES:                   Age Men Women   60-64 <14 <12   65-69 <12 <11   70-74 <12 <10   75-79 <11 <10   80-84 <10 <9   85-89 <8 <8   90-94 <7 <4      Tool Used: Timed Up and Go (TUG)  Score:  Initial: 7.74 seconds, 97%, 73 HR Most Recent: X seconds (Date: -- )   Interpretation of Score: The test measures, in seconds, the time taken by an individual to stand up from a standard arm chair (seat height 46 cm [18 in], arm height 65 cm [25.6 in]), walk a distance of 3 meters (118 in, approx 10 ft), turn, walk back to the chair and sit down. If the individual takes longer than 14 seconds to complete TUG, this indicates risk for falls. Tool Used:  Strength   Score:  Initial: 40# L,32 # R Most Recent: X pounds (Date: -- )       Medical Necessity:   · Patient is expected to demonstrate progress in strength, range of motion, balance, and functional technique to increase independence with functional tasks. Reason for Services/Other Comments:  · Patient continues to demonstrate capacity to improve strength, ROM, balance, and functional mobility which will increase independence. Total Duration: 30 min eval, 25 mins therex  PT Patient Time In/Time Out  Time In: 1055  Time Out: 1200    Rehabilitation Potential For Stated Goals: Good  Regarding Berenice Garrison's therapy, I certify that the treatment plan above will be carried out by a therapist or under their direction. Thank you for this referral,  Jayson Almanza PT     Referring Physician Signature: Claudia Rodriguez NP             The information in this section was collected on 1/26/22 (except where otherwise noted). HISTORY:   History of Present Injury/Illness (Reason for Referral):   Pt was diagnosed w/ COVID in September of 2021. She went to ER a few times but her oxygen was always too high to be admitted. She was never on supplemental oxygen. She was an avid  and has since been SOB with most activities. Notes she is easing her way back into tennis and her breathing has improved. Past Medical History/Comorbidities:   Ms. Cintia Kwon  has a past medical history of Actinic keratosis (7/24/2015), Arthritis, Benign neoplasm of colon (7/24/2015), Cough, COVID (09/2021), Diverticulitis of colon (without mention of hemorrhage)(562.11) (7/24/2015), Diverticulosis of colon (without mention of hemorrhage) (7/24/2015), Encounter for long-term (current) use of other medications (7/24/2015), Essential hypertension, benign (7/24/2015), Former smoker, GERD (gastroesophageal reflux disease), Nausea & vomiting, Postmenopausal atrophic vaginitis (7/29/2015), PUD (peptic ulcer disease), Pure hypercholesterolemia (07/24/2015), and Thyroid nodule. She has no past medical history of Adverse effect of anesthesia, Difficult intubation, Malignant hyperthermia due to anesthesia, or Pseudocholinesterase deficiency. Ms. Cintia Kwon  has a past surgical history that includes hx appendectomy; hx back surgery (1995); hx hysterectomy; hx rotator cuff repair (Right, 5/2013); and hx cataract removal (Bilateral). Social History/Living Environment:     Lives w/ spouse in house w/ 4 steps  Prior Level of Function/Work/Activity:   Independent, retired  Dominant Side:         RIGHT      Ambulatory/Rehab Services H2 Model Falls Risk Assessment 1/26/22    Risk Factors:       No Risk Factors Identified Ability to Rise from Chair:       (0)  Ability to rise in a single movement    Falls Prevention Plan:       No modifications necessary   Total: (5 or greater = High Risk): 0    ©2010 [facility] Kisha M[facility]ain [facility] States Patent #[id number].  Federal Law prohibits the replication, distribution or use without written permission from [facility]     Current Medications:      Current Outpatient Medications:     hydroCHLOROthiazide (HYDRODIURIL) 12.5 mg tablet, TAKE ONE TABLET BY MOUTH EVERY DAY, Disp: 90 Tablet, Rfl: 3    Omeprazole delayed release (PRILOSEC D/R) 20 mg tablet, Take 20 mg by mouth daily. , Disp: , Rfl:     lovastatin (MEVACOR) 20 mg tablet, TAKE ONE TABLET BY MOUTH EVERY DAY AT NIGHT, Disp: 60 Tablet, Rfl: 11    levothyroxine (SYNTHROID) 88 mcg tablet, Take 1 Tablet by mouth every Monday, Wednesday, Friday., Disp: 30 Tablet, Rfl: 11    albuterol (PROVENTIL HFA, VENTOLIN HFA, PROAIR HFA) 90 mcg/actuation inhaler, Take 1 Puff by inhalation every four (4) hours as needed for Wheezing, Shortness of Breath or Cough. , Disp: 18 g, Rfl: 3    losartan (COZAAR) 100 mg tablet, Take 1 Tablet by mouth daily. , Disp: 90 Tablet, Rfl: 3    BIOTIN PO, Take 1 Cap by mouth daily. , Disp: , Rfl:     esomeprazole (NEXIUM 24HR) 20 mg capsule, Take 1 Cap by mouth daily. (Patient not taking: Reported on 1/10/2022), Disp: 1 Cap, Rfl: 0    MULTIVITS,CA,MINERALS/IRON/FA (WOMEN'S ONE DAILY PO), Take 1 Tab by mouth daily. , Disp: , Rfl:     aspirin delayed-release 81 mg tablet, Take 81 mg by mouth daily. Take / use AM day of surgery  per anesthesia protocols. , Disp: , Rfl:     Calcium-Cholecalciferol, D3, (CALCIUM 600 WITH VITAMIN D3) 600 mg(1,500mg) -400 unit cap, Take 1 Tab by mouth daily. , Disp: , Rfl:    Date Last Reviewed:  1/26/2022    Number of Personal Factors/Comorbidities that affect the Plan of Care: 1-2: MODERATE COMPLEXITY   EXAMINATION:   Observation/Orthostatic Postural Assessment:           Forward head, rounded shoulders  Special Tests:          See above  Functional Mobility:         Gait/Ambulation:  Independent        Transfers:  Independent        Bed Mobility:  Independent        Stairs:  Independent  Balance:          See above in outcome measures for specific results  Sensation:         Intact w/ light touch to BLEs    Joint/Muscle ROM Strength Updates   Hip flexion WFL 4+/5 B    Hip extension [facility] 4/5 B    Hip abduction [facility] 4/5 B    Knee flexion WFL 5/5 B    Knee extension WFL 5/5 B    DF WFL 5/5 B         Body Structures Involved:  1. Heart  2. Lungs  3. Thoracic Cage  4. Muscles Body Functions Affected:  1. Mental  2. Cardio  3. Respiratory  4. Neuromusculoskeletal  5. Movement Related Activities and Participation Affected:  1. General Tasks and Demands  2. Communication  3. Mobility  4. Self Care  5. Domestic Life  6. Interpersonal Interactions and Relationships  7. Community, Social and [place]   Number of elements (examined above) that affect the Plan of Care: 3: MODERATE COMPLEXITY   CLINICAL PRESENTATION:   Presentation: Stable and uncomplicated: LOW COMPLEXITY   CLINICAL DECISION MAKING:      Use of outcome tool(s) and clinical judgement create a POC that gives a: Clear prediction of patient's progress: LOW COMPLEXITY              Pain: Initial:   Pain Intensity 1: 0  Post Session:  0/10     [employer] Portal  Access Code: [email or web address]  URL: [email or web address]  Date: 01/26/2022  Prepared by: Kayli Rubi    Exercises  Supine Diaphragmatic Breathing - 2 x daily - 7 x weekly - 1 sets - 10 reps  Mini Squat with Counter Support - 1 x daily - 3 x weekly - 2 sets - 10 reps - 5 hold  Sit to Stand without Arm Support - 1 x daily - 3 x weekly - 2 sets - 10 reps - 5 hold  Standing March with Counter Support - 1 x daily - 3 x weekly - 2 sets - 10 reps - 5 hold  Standing Hip Extension with Counter Support - 1 x daily - 3 x weekly - 2 sets - 10 reps - 5 hold  Standing Hip Abduction with Counter Support - 1 x daily - 3 x weekly - 2 sets - 10 reps - 5 hold  Heel Toe Raises with Counter Support - 1 x daily - 3 x weekly - 2 sets - 10 reps - 5 hold    Variance from POC: none    Kayli Rubi, PT

## 2022-01-26 NOTE — PROGRESS NOTES
Thomas March  : 1940  [place]  Secondary: Kristopher Cooney [facility] [address], [place] [address]  [phone]   [phone]      OUTPATIENT PHYSICAL THERAPY: Daily Treatment Note 2022  Visit Count:  1    ICD-10: Treatment Diagnosis: M62.81 Muscle weakness (generalized); R26.2 Difficulty in walking; R26.81 Unsteadiness on feet  Precautions/Allergies:   Sulfa (sulfonamide antibiotics)   TREATMENT PLAN:  Effective Dates: 2022 TO 3/27/2022 (60 days). 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 Pain: Initial:Pain Intensity 1: 0 Post Session:  0/10   Medications Last Reviewed:  2022  Updated Objective Findings:  See evaluation note from today  TREATMENT:     THERAPEUTIC EXERCISE: (25 minutes):  Exercises per grid below to improve mobility, strength, balance and coordination. Required minimal verbal and tactile cues to promote proper body alignment, promote proper body posture, promote proper body mechanics and promote proper body breathing techniques. Progressed resistance, range, repetitions and complexity of movement as indicated. Date:  22 Date:   Date:     Activity/Exercise Parameters Parameters Parameters   NuStep 10 mins, 3.0 95%      Sit to stand 13x     Mini squats 10x     Marching 77x     Bridge 10x w/ paced breathing     Hip abduction 10x B at counter     Hip extension 10x B at counter     Heel/toe raise 10x at counter     Diaphragmatic breathing 5 minutes               [employer] Portal  Access Code: [email or web address]  URL: [email or web address]  Date: 2022  Prepared by: Umang Esteban Caroline    Exercises  Supine Diaphragmatic Breathing - 2 x daily - 7 x weekly - 1 sets - 10 reps  Mini Squat with Counter Support - 1 x daily - 3 x weekly - 2 sets - 10 reps - 5 hold  Sit to Stand without Arm Support - 1 x daily - 3 x weekly - 2 sets - 10 reps - 5 hold  Standing March with Counter Support - 1 x daily - 3 x weekly - 2 sets - 10 reps - 5 hold  Standing Hip Extension with Counter Support - 1 x daily - 3 x weekly - 2 sets - 10 reps - 5 hold  Standing Hip Abduction with Counter Support - 1 x daily - 3 x weekly - 2 sets - 10 reps - 5 hold  Heel Toe Raises with Counter Support - 1 x daily - 3 x weekly - 2 sets - 10 reps - 5 hold    Treatment/Session Summary:    · Response to Treatment: Pt was challenged w/ therex. Oxygen dropped to 93% at its lowest with activity. See eval note for more detail. · Communication/Consultation:  None today  · Equipment provided today:  None today  · Recommendations/Intent for next treatment session: Next visit will focus on progression of functional tasks as tolerated.     Total Treatment Billable Duration:  25 minutes therex  PT Patient Time In/Time Out  Time In: 1055  Time Out: 4302 [facility]    Future Appointments   Date Time Provider Unique Lundberg   1/27/2022 11:15 AM Barbi Dutta MD [facility] FIM FIM   1/27/2022 11:15 AM FIM WELLNESS [facility] FIM FIM   2/2/2022 10:00 AM Sy Holloway PT [facility]   2/9/2022 11:00 AM Ellen Burger PT [facility]   2/14/2022  1:00 PM Ellen Burger PT [facility]   2/16/2022 11:30 AM WESLEY ECHO 60 SSA UCDE UCD   2/23/2022 10:30 AM Sy Holloway PT [facility]   3/29/2022 11:15 AM CONSOLIDATED DRIVE THRU SSA IMD IMD   4/1/2022 11:15 AM PP PFT LAB [facility]SA PP PP   4/11/2022  2:50 PM Noreen Simmons, NP SSA PP PP

## 2022-02-02 ENCOUNTER — HOSPITAL ENCOUNTER (OUTPATIENT)
Dept: PHYSICAL THERAPY | Age: 82
Discharge: HOME | End: 2022-02-02
Payer: MEDICARE

## 2022-02-02 PROCEDURE — 97110 THERAPEUTIC EXERCISES: CPT

## 2022-02-02 NOTE — PROGRESS NOTES
Maninder Alegria  : 1940  [place]  [facility]: [facility]oney [facility] [address], [place] [address]  [phone]   [phone]      OUTPATIENT PHYSICAL THERAPY: Daily Treatment Note 2022  Visit Count:  2    ICD-10: Treatment Diagnosis: M62.81 Muscle weakness (generalized); R26.2 Difficulty in walking; R26.81 Unsteadiness on feet  Precautions/Allergies:   Sulfa (sulfonamide antibiotics)   TREATMENT PLAN:  Effective Dates: 2022 TO 3/27/2022 (60 days). Frequency/Duration: 1 time a week for 60 Day(s) MEDICAL/REFERRING DIAGNOSIS:  Post covid-19 condition, unspecified [U09.9]   DATE OF ONSET: 2021  REFERRING PHYSICIAN: Neto Fuller NP MD Orders: PT eval and tx   Return MD Appointment: 22     Pre-treatment Symptoms/Complaints:  SOB w/ activities is better. Played tennis and wasn't as short of breath. Pain: Initial:Pain Intensity 1: 0 Post Session:  0/10   Medications Last Reviewed:  2022  Updated Objective Findings:  97% at entry  TREATMENT:     THERAPEUTIC EXERCISE: (55 minutes):  Exercises per grid below to improve mobility, strength, balance and coordination. Required minimal verbal and tactile cues to promote proper body alignment, promote proper body posture, promote proper body mechanics and promote proper body breathing techniques. Progressed resistance, range, repetitions and complexity of movement as indicated.    Date:  22 Date:  22 Date:     Activity/Exercise Parameters Parameters Parameters   NuStep 10 mins, 3.0 95%  10 mins, 3.0, 95%    UBE  10 mins, 1.5, 97%    Sit to stand 13x 10x 96%    Mini squats 10x 2x10 96%    Marching 77x 20x B 97%    Bridge 10x w/ paced breathing 20x w/ breathing 96-97%    Hip abduction 10x B at counter     Hip extension 10x B at counter     Heel/toe raise 10x at counter     Diaphragmatic breathing 5 minutes 1 minute supine Leg press  2x10 50#    Clamshell      Walking  Interval walking in gym: 95%  On treadmill 1.5 speed 3 incline 92% after 2:30 - stopped at that time due to oxygen level walking        [employer]  Access Code: [email or web address]  URL: [email or web address]  Date: 01/26/2022  Prepared by: Will Nedlin    Exercises  Supine Diaphragmatic Breathing - 2 x daily - 7 x weekly - 1 sets - 10 reps  Mini Squat with Counter Support - 1 x daily - 3 x weekly - 2 sets - 10 reps - 5 hold  Sit to Stand without Arm Support - 1 x daily - 3 x weekly - 2 sets - 10 reps - 5 hold  Standing March with Counter Support - 1 x daily - 3 x weekly - 2 sets - 10 reps - 5 hold  Standing Hip Extension with Counter Support - 1 x daily - 3 x weekly - 2 sets - 10 reps - 5 hold  Standing Hip Abduction with Counter Support - 1 x daily - 3 x weekly - 2 sets - 10 reps - 5 hold  Heel Toe Raises with Counter Support - 1 x daily - 3 x weekly - 2 sets - 10 reps - 5 hold    Treatment/Session Summary:    · Response to Treatment: Pt had no issues w/ therex increase. She did have some SOB, mostly with longer bouts of activity, but even within 2 minutes of walking on the treadmill at 3% incline. · Communication/Consultation:  None today  · Equipment provided today:  None today  · Recommendations/Intent for next treatment session: Next visit will focus on progression of functional tasks as tolerated.     Total Treatment Billable Duration:  55 minutes therex  PT Patient Time In/Time Out  Time In: 4622  Time Out: 811 [facility], PT    Future Appointments   Date Time Provider Unique Lundberg   2/9/2022 11:00 AM Reny Tatum, PT [facility]   2/16/2022 11:30 AM WESLEY ECHO 60 [facility] UCDE UCD   2/17/2022  1:45 PM Ellen Burger, PT [facility]   2/23/2022 10:30 AM Hood Sneed, PT [facility]   3/29/2022 11:15 AM CONSOLIDATED DRIVE THRU [facility] IMD IMD   4/1/2022 11:15 AM PP PFT LAB [facility]SA PP PP 4/11/2022  2:50 PM Luis Manuel Bauman NP SSA PP PP   8/1/2022 10:30 AM Maryam Díaz MD SSA FIM FIM

## 2022-02-09 ENCOUNTER — HOSPITAL ENCOUNTER (OUTPATIENT)
Dept: PHYSICAL THERAPY | Age: 82
Discharge: HOME | End: 2022-02-09
Payer: MEDICARE

## 2022-02-09 PROCEDURE — 97110 THERAPEUTIC EXERCISES: CPT

## 2022-02-09 NOTE — PROGRESS NOTES
Latha Gray  : 1940  [place]  Secondary: Kristopher Cooney at [facility] , [address], [place] [address]  [phone]   [phone]      OUTPATIENT PHYSICAL THERAPY: Daily Treatment Note 2022  Visit Count:  3    ICD-10: Treatment Diagnosis: M62.81 Muscle weakness (generalized); R26.2 Difficulty in walking; R26.81 Unsteadiness on feet  Precautions/Allergies:   Sulfa (sulfonamide antibiotics)   TREATMENT PLAN:  Effective Dates: 2022 TO 3/27/2022 (60 days). Frequency/Duration: 1 time a week for 60 Day(s) MEDICAL/REFERRING DIAGNOSIS:  Post covid-19 condition, unspecified [U09.9]   DATE OF ONSET: 2021  REFERRING PHYSICIAN: Bob Casillas NP MD Orders: PT eval and tx   Return MD Appointment: 22     Pre-treatment Symptoms/Complaints:  SOB continues to improve with activities. Pain: Initial:Pain Intensity 1: 0 Post Session:  0/10   Medications Last Reviewed:  2022  Updated Objective Findings:  96% at entry  HR 74  TREATMENT:     THERAPEUTIC EXERCISE: (55 minutes):  Exercises per grid below to improve mobility, strength, balance and coordination. Required minimal verbal and tactile cues to promote proper body alignment, promote proper body posture, promote proper body mechanics and promote proper body breathing techniques. Progressed resistance, range, repetitions and complexity of movement as indicated.    Date:  22 Date:  22 Date:  22   Activity/Exercise Parameters Parameters Parameters   NuStep 10 mins, 3.0 95%  10 mins, 3.0, 95% 10 mins, 3.0, 94 %   UBE  10 mins, 1.5, 97% 10 mins, 1.5 95%   Sit to stand 13x 10x 96% 2 x 10 95%   Mini squats 10x 2x10 96%    Marching 77x 20x B 97% 20 x B 95%   Bridge 10x w/ paced breathing 20x w/ breathing 96-97%    Hip abduction 10x B at counter     Hip extension 10x B at counter     Heel/toe raise 10x at counter     Diaphragmatic breathing 5 minutes 1 minute supine    Leg press  2x10 50# 2 x 10 50#   Clamshell      Walking  Interval walking in gym: 95%  On treadmill 1.5 speed 3 incline 92% after 2:30 - stopped at that time due to oxygen level walking Treadmill 2 min at 1.5 mph, 30 sec at 3% with 1 min rest at 0%, 30 sec at 5% with 1 min rest at 0%. 95%   SLS   On airex with hip abd, ext 10x ea; B   Lateral step over   30 secs over cristobal       [email or web address] Portal  Access Code: [email or web address]  URL: [email or web address]  Date: 01/26/2022  Prepared by: Anahi Dilling    Exercises  Supine Diaphragmatic Breathing - 2 x daily - 7 x weekly - 1 sets - 10 reps  Mini Squat with Counter Support - 1 x daily - 3 x weekly - 2 sets - 10 reps - 5 hold  Sit to Stand without Arm Support - 1 x daily - 3 x weekly - 2 sets - 10 reps - 5 hold  Standing March with Counter Support - 1 x daily - 3 x weekly - 2 sets - 10 reps - 5 hold  Standing Hip Extension with Counter Support - 1 x daily - 3 x weekly - 2 sets - 10 reps - 5 hold  Standing Hip Abduction with Counter Support - 1 x daily - 3 x weekly - 2 sets - 10 reps - 5 hold  Heel Toe Raises with Counter Support - 1 x daily - 3 x weekly - 2 sets - 10 reps - 5 hold    Treatment/Session Summary:    · Response to Treatment: Pt tolerated treadmill much better today without complaints of SOB. Also tolerated progression of exercise activity well  · Communication/Consultation:  None today  · Equipment provided today:  None today  · Recommendations/Intent for next treatment session: Next visit will focus on progression of functional tasks as tolerated.     Total Treatment Billable Duration:  55 minutes therex  PT Patient Time In/Time Out  Time In: 1100  Time Out: 404 [facility], PT    Future Appointments   Date Time Provider Unique Lundberg   2/16/2022 11:30 AM WESLEY ECHO 60 SSA UCDE UCD   2/17/2022  1:45 PM [facility]   2/23/2022 10:30 AM Joy Ruiz PT [facility]   3/29/2022 11:15 AM CONSOLIDATED DRIVE THRU SSA IMD IMD   4/1/2022 11:15 AM PP PFT LAB SSA PP PP   4/11/2022  2:50 PM Giovanni Frias NP SSA PP PP   8/1/2022 10:30 AM Oli Hussein MD SSA FIM FIM

## 2022-02-14 ENCOUNTER — APPOINTMENT (OUTPATIENT)
Dept: PHYSICAL THERAPY | Age: 82
End: 2022-02-14
Payer: MEDICARE

## 2022-02-17 ENCOUNTER — HOSPITAL ENCOUNTER (OUTPATIENT)
Dept: PHYSICAL THERAPY | Age: 82
Discharge: HOME | End: 2022-02-17
Payer: MEDICARE

## 2022-02-17 PROCEDURE — 97110 THERAPEUTIC EXERCISES: CPT

## 2022-02-17 NOTE — PROGRESS NOTES
Maninder Alegria  : 1940  [place]  Secondary: [facility]oney [facility], [address], [place] [address]  [phone]   [phone]      OUTPATIENT PHYSICAL THERAPY: Daily Treatment Note 2022  Visit Count:  4    ICD-10: Treatment Diagnosis: M62.81 Muscle weakness (generalized); R26.2 Difficulty in walking; R26.81 Unsteadiness on feet  Precautions/Allergies:   Sulfa (sulfonamide antibiotics)   TREATMENT PLAN:  Effective Dates: 2022 TO 3/27/2022 (60 days). 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 Also discussed the need for suppl O2 at night due to low 02 sats at night from last MD visit  Pain: Initial:Pain Intensity 1: 0 Post Session:  0/10   Medications Last Reviewed:  2022  Updated Objective Findings:  96% at entry  HR 90  TREATMENT:     THERAPEUTIC EXERCISE: (55 minutes):  Exercises per grid below to improve mobility, strength, balance and coordination. Required minimal verbal and tactile cues to promote proper body alignment, promote proper body posture, promote proper body mechanics and promote proper body breathing techniques. Progressed resistance, range, repetitions and complexity of movement as indicated.    Date:  22 Date:  22 Date:  22 Date:  22   Activity/Exercise Parameters Parameters Parameters    NuStep 10 mins, 3.0 95%  10 mins, 3.0, 95% 10 mins, 3.0, 94 % 10 mins, 3.0  95%    UBE  10 mins, 1.5, 97% 10 mins, 1.5 95% 10 mins 1.5 95%    Sit to stand 13x 10x 96% 2 x 10 95% 2 x 10 95%   Mini squats 10x 2x10 96%     Marching 77x 20x B 97% 20 x B 95% Bridge 10x w/ paced breathing 20x w/ breathing 96-97%     Hip abduction 10x B at counter      Hip extension 10x B at counter      Heel/toe raise 10x at counter      Diaphragmatic breathing 5 minutes 1 minute supine     Leg press  2x10 50# 2 x 10 50#    Clamshell       Walking  Interval walking in gym: 95%  On treadmill 1.5 speed 3 incline 92% after 2:30 - stopped at that time due to oxygen level walking Treadmill 2 min at 1.5 mph, 30 sec at 3% with 1 min rest at 0%, 30 sec at 5% with 1 min rest at 0%. 95% Treadmill 2 min at 1.5 mph, 30 sec at 3% with 1 min rest at 0%, 30 sec  At at 3% with 1 min rest, then  30 sec at 5% with 1.5 min rest at 0%. 95%   SLS   On airex with hip abd, ext 10x ea; B    Lateral step over   30 secs over cristobal    Step ups    6\" 2 x 5 B (2X)  94%          [employer] Portal  Access Code: [email or web address]  URL: [email or web address]  Date: 01/26/2022  Prepared by: Marielena Jameson    Exercises  Supine Diaphragmatic Breathing - 2 x daily - 7 x weekly - 1 sets - 10 reps  Mini Squat with Counter Support - 1 x daily - 3 x weekly - 2 sets - 10 reps - 5 hold  Sit to Stand without Arm Support - 1 x daily - 3 x weekly - 2 sets - 10 reps - 5 hold  Standing March with Counter Support - 1 x daily - 3 x weekly - 2 sets - 10 reps - 5 hold  Standing Hip Extension with Counter Support - 1 x daily - 3 x weekly - 2 sets - 10 reps - 5 hold  Standing Hip Abduction with Counter Support - 1 x daily - 3 x weekly - 2 sets - 10 reps - 5 hold  Heel Toe Raises with Counter Support - 1 x daily - 3 x weekly - 2 sets - 10 reps - 5 hold    Treatment/Session Summary:    · Response to Treatment: Pt tolerated very well today requiring brief rest periods with step up , squats and treadmill. · Communication/Consultation:  None today  · Equipment provided today:  None today  · Recommendations/Intent for next treatment session: Next visit will focus on progression of functional tasks as tolerated.     Total Treatment Billable Duration:  55 minutes therex  PT Patient Time In/Time Out  Time In: 2809  [facility]    Future Appointments   Date Time Provider Unique Lundberg   2/23/2022 10:30 AM David Sommers, PT [facility]   3/29/2022 11:15 AM [address]IVE THRU [facility] IMD IMD   4/1/2022 11:15 AM PP PFT LAB [facility] PP PP   4/11/2022  2:50 PM Carissa Wilder NP [facility] PP PP   8/1/2022 10:30 AM Christophe Castro MD [facility] FIM FIM

## 2022-02-23 ENCOUNTER — HOSPITAL ENCOUNTER (OUTPATIENT)
Dept: PHYSICAL THERAPY | Age: 82
Discharge: HOME | End: 2022-02-23
Payer: MEDICARE

## 2022-02-23 PROCEDURE — 97110 THERAPEUTIC EXERCISES: CPT

## 2022-02-23 NOTE — PROGRESS NOTES
Cuca Oleg  : 1940  [place] / [place] / Product Type: Medicare /    [address]  at [facility]  [address] , [address], [place] [address]  [phone]   [phone]         OUTPATIENT PHYSICAL THERAPY: Discharge Note  2022    ICD-10: Treatment Diagnosis: M62.81 Muscle weakness (generalized); R26.2 Difficulty in walking; R26.81 Unsteadiness on feet  Precautions/Allergies:   Sulfa (sulfonamide antibiotics)   TREATMENT PLAN:  Effective Dates: 2022 TO 3/27/2022 (60 days). Frequency/Duration: 1 time a week for 60 Day(s) MEDICAL/REFERRING DIAGNOSIS:  Post covid-19 condition, unspecified [U09.9]   DATE OF ONSET: 2021  REFERRING PHYSICIAN: Luis Manuel Bauman NP MD Orders: PT eval and tx   Return MD Appointment: 22     INITIAL ASSESSMENT:  Ms. Rose Mary Ortiz presents to PT eval s/p bout with COVID 19. Prior to [employer] she would play tennis without any SOB and walk her dog without any SOB. Currently, her oxygen drops with exertion, but it is improving as she has begun to exercise again. With testing, she was found to be a fall risk with a score below 80% on the ABC scale and she displayed decreased functional capacity with her 2 minute step in place score of 77 repetitions. With testing today, her oxygen ranged from 93-98% and her SOB ranged from 0/10 to 9/10. Ms. Rose Mary Ortiz will benefit from continued skilled PT services to improve deficits listed below and to progress towards their PLOF. Updated Assessment:  Ms. Rose Mary Ortiz presented to PT eval s/p bout with COVID 19. With PT, she has improved her ability to walk her dog and play tennis w/o SOB. Her resting and exercising oxygen levels have improved although she still drops to mid to low 90s occasionally with high exertion. Her oxygen returns quickly with a rest break. At this time, she has met 7/7 PT goals and is independent with her HEP.  Thank you for this referral.    GOALS: (Goals have been discussed and agreed upon with patient.)  Short-Term Functional Goals: Time Frame: 3 weeks  1. Pt will be independent w/ HEP in order to improve outcomes and decrease pain. MET  2. Pt will have TUG score of 6.7 seconds or less displaying improved functional capacity and balance. MET  3. Pt will perform >5 continuous minutes of standing dynamic balance w/o LOB or use of UE assist. MET  Discharge Goals: Time Frame: 4 weeks  1. Pt will have 30 second chair sit to stand score of 15 or greater displaying improved functional capacity and balance. MET  2. Pt will have 2 minutes step in place score of 100 or greater displaying improved functional capacity and strength. MET  3. Pt will have 4+/5 B LE strength in order to safely perform daily functional activities. MET  4. Pt will have ABC score of 80% or greater in order to show improved balance and safety. MET    Outcome Measure: 2 Minute Step in Place Test    Score:  Initial: 77 steps Most Recent: 100 steps, 95%, 116 HR (Date: 2/23/22)   Interpretation of Score: See below for range of scores between 25% and 75% percentiles                 1/26/22 Baseline End of Test   Heart Rate 155/75  190/79   Dyspnea (Terrance Scale)  2/10  9/10   Fatigue (Terrance Scale)  0/10  9/10   SpO2  97%  93%   BP  75  109                       Age Men Women   60-64     65-70     70-74     75-79     80-84  60-90   85-89 59-91 55-85   90-94 52-86 44-72      Tool Used: Activities-Specific Balance Confidence (ABC) Scale  Score:  Initial: 76.875 % Confidence Most Recent: 94.375% Confidence (Date: 2/23/22)   Interpretation of Score: The test rates the patients percentage of confidence with functional daily activities from 0%, indicating no confidence, to 100%, indicating complete confidence. The percentages are added together and then averaged. If the average is less than 80%, this indicates significant impairment with daily activities. Tool Used: 30 sec sit to stand  Score:  Initial: 13x, 95% 98 HR Most Recent:17x, 95% (Date: 2/23/22 )   Interpretation of Score: A below average score indicates a risk for falls  BELOW AVERAGE SCORES:                   Age Men Women   60-64 <14 <12   65-69 <12 <11   70-74 <12 <10   75-79 <11 <10   80-84 <10 <9   85-89 <8 <8   90-94 <7 <4      Tool Used: Timed Up and Go (TUG)  Score:  Initial: 7.74 seconds, 97%, 73 HR Most Recent: 6.42 seconds, 97%  (Date: 2/23/22 )   Interpretation of Score: The test measures, in seconds, the time taken by an individual to stand up from a standard arm chair (seat height 46 cm [18 in], arm height 65 cm [25.6 in]), walk a distance of 3 meters (118 in, approx 10 ft), turn, walk back to the chair and sit down. If the individual takes longer than 14 seconds to complete TUG, this indicates risk for falls. Tool Used:  Strength   Score:  Initial: 40# L,32 # R Most Recent: 50# L, 38# R  pounds (Date: 2/23/22)       PT Patient Time In/Time Out  Time In: 1030  Time Out: 1135    Rehabilitation Potential For Stated Goals: Good  Regarding Berenice Garrison's therapy, I certify that the treatment plan above will be carried out by a therapist or under their direction. Thank you for this referral,  Jayson Almanza PT     Referring Physician Signature: Claudia Rodriguez NP             The information in this section was collected on 1/26/22 (except where otherwise noted). HISTORY:   History of Present Injury/Illness (Reason for Referral):  Pt was diagnosed w/ COVID in September of 2021. She went to ER a few times but her oxygen was always too high to be admitted. She was never on supplemental oxygen. She was an avid  and has since been SOB with most activities. Notes she is easing her way back into tennis and her breathing has improved.    Past Medical History/Comorbidities:   Ms. Jose D Knight  has a past medical history of Actinic keratosis (7/24/2015), Arthritis, Benign neoplasm of colon (7/24/2015), Cough, COVID (09/2021), Diverticulitis of colon (without mention of hemorrhage)(562.11) (7/24/2015), Diverticulosis of colon (without mention of hemorrhage) (7/24/2015), Encounter for long-term (current) use of other medications (7/24/2015), Essential hypertension, benign (7/24/2015), Former smoker, GERD (gastroesophageal reflux disease), Nausea & vomiting, Postmenopausal atrophic vaginitis (7/29/2015), PUD (peptic ulcer disease), Pure hypercholesterolemia (07/24/2015), and Thyroid nodule. She has no past medical history of Adverse effect of anesthesia, Difficult intubation, Malignant hyperthermia due to anesthesia, or Pseudocholinesterase deficiency. Ms. Cintia Kwon  has a past surgical history that includes hx appendectomy; hx back surgery (1995); hx hysterectomy; hx rotator cuff repair (Right, 5/2013); and hx cataract removal (Bilateral). Social History/Living Environment:     Lives w/ spouse in house w/ 4 steps  Prior Level of Function/Work/Activity:   Independent, retired  Dominant Side:         RIGHT      Ambulatory/Rehab Services H2 Model Falls Risk Assessment 1/26/22    Risk Factors:       No Risk Factors Identified Ability to Rise from Chair:       (0)  Ability to rise in a single movement    Falls Prevention Plan:       No modifications necessary   Total: (5 or greater = High Risk): 0    ©2010 [facility] [address] [place] Patent #[id number]. Federal Law prohibits the replication, distribution or use without written permission from [facility]     Current Medications:      Current Outpatient Medications:     hydroCHLOROthiazide (HYDRODIURIL) 12.5 mg tablet, TAKE ONE TABLET BY MOUTH EVERY DAY, Disp: 90 Tablet, Rfl: 3    Omeprazole delayed release (PRILOSEC D/R) 20 mg tablet, Take 20 mg by mouth daily. , Disp: , Rfl:     lovastatin (MEVACOR) 20 mg tablet, TAKE ONE TABLET BY MOUTH EVERY DAY AT NIGHT, Disp: 60 Tablet, Rfl: 11    levothyroxine (SYNTHROID) 88 mcg tablet, Take 1 Tablet by mouth every Monday, Wednesday, Friday., Disp: 30 Tablet, Rfl: 11    albuterol (PROVENTIL HFA, VENTOLIN HFA, PROAIR HFA) 90 mcg/actuation inhaler, Take 1 Puff by inhalation every four (4) hours as needed for Wheezing, Shortness of Breath or Cough. (Patient not taking: Reported on 1/27/2022), Disp: 18 g, Rfl: 3    losartan (COZAAR) 100 mg tablet, Take 1 Tablet by mouth daily. , Disp: 90 Tablet, Rfl: 3    BIOTIN PO, Take 1 Cap by mouth daily. , Disp: , Rfl:     esomeprazole (NEXIUM 24HR) 20 mg capsule, Take 1 Cap by mouth daily. , Disp: 1 Cap, Rfl: 0    MULTIVITS,CA,MINERALS/IRON/FA (WOMEN'S ONE DAILY PO), Take 1 Tab by mouth daily. , Disp: , Rfl:     aspirin delayed-release 81 mg tablet, Take 81 mg by mouth daily. Take / use AM day of surgery  per anesthesia protocols. , Disp: , Rfl:     Calcium-Cholecalciferol, D3, (CALCIUM 600 WITH VITAMIN D3) 600 mg(1,500mg) -400 unit cap, Take 1 Tab by mouth daily. , Disp: , Rfl:    Date Last Reviewed:  2/23/2022    EXAMINATION:   Observation/Orthostatic Postural Assessment: Forward head, rounded shoulders  Special Tests:          See above  Functional Mobility:         Gait/Ambulation:  Independent        Transfers:  Independent        Bed Mobility:  Independent        Stairs:  Independent  Balance:          See above in outcome measures for specific results  Sensation:         Intact w/ light touch to BLEs    Joint/Muscle ROM Strength Updates   Hip flexion WFL 4+/5 B 5/5 B   Hip extension WFL 4/5 B 4+/5 B   Hip abduction WFL 4/5 B 4+/5 B   Knee flexion WFL 5/5 B    Knee extension WFL 5/5 B    DF WFL 5/5 B         Body Structures Involved:  1. Heart  2. Lungs  3. Thoracic Cage  4. Muscles Body Functions Affected:  1. Mental  2. Cardio  3. Respiratory  4. Neuromusculoskeletal  5. Movement Related Activities and Participation Affected:  1. General Tasks and Demands  2. Communication  3. Mobility  4. Self Care  5.  Domestic Life  6. Interpersonal Interactions and Relationships  7. Community, Social and Civic Life   CLINICAL PRESENTATION:   CLINICAL DECISION MAKING:                 Pain: Initial:   Pain Intensity 1: 0  Post Session:  0/10     [facility]  Access Code: [email or web address]  URL: [email or web address]  Date: 01/26/2022  Prepared by: Jayson Almanza    Exercises  Supine Diaphragmatic Breathing - 2 x daily - 7 x weekly - 1 sets - 10 reps  Mini Squat with Counter Support - 1 x daily - 3 x weekly - 2 sets - 10 reps - 5 hold  Sit to Stand without Arm Support - 1 x daily - 3 x weekly - 2 sets - 10 reps - 5 hold  Standing March with Counter Support - 1 x daily - 3 x weekly - 2 sets - 10 reps - 5 hold  Standing Hip Extension with Counter Support - 1 x daily - 3 x weekly - 2 sets - 10 reps - 5 hold  Standing Hip Abduction with Counter Support - 1 x daily - 3 x weekly - 2 sets - 10 reps - 5 hold  Heel Toe Raises with Counter Support - 1 x daily - 3 x weekly - 2 sets - 10 reps - 5 hold    Variance from POC: none    Jayson Almanza, PT

## 2022-02-23 NOTE — PROGRESS NOTES
Adolfo Davison  : 1940  [place]  [facility]: [facility], [address], [place] [address]  [phone]   [phone]      OUTPATIENT PHYSICAL THERAPY: Daily Treatment Note 2022  Visit Count:  5    ICD-10: Treatment Diagnosis: M62.81 Muscle weakness (generalized); R26.2 Difficulty in walking; R26.81 Unsteadiness on feet  Precautions/Allergies:   Sulfa (sulfonamide antibiotics)   TREATMENT PLAN:  Effective Dates: 2022 TO 3/27/2022 (60 days). Frequency/Duration: 1 time a week for 60 Day(s) MEDICAL/REFERRING DIAGNOSIS:  Post covid-19 condition, unspecified [U09.9]   DATE OF ONSET: 2021  REFERRING PHYSICIAN: Lester Howard NP MD Orders: PT eval and tx   Return MD Appointment: 22     Pre-treatment Symptoms/Complaints:  Reports feeling more like herself and having better oxygen levels. Pain: Initial:Pain Intensity 1: 0 Post Session:  0/10   Medications Last Reviewed:  2022  Updated Objective Findings:  96% at entry  HR 90  TREATMENT:     THERAPEUTIC EXERCISE: (55 minutes):  Exercises per grid below to improve mobility, strength, balance and coordination. Required minimal verbal and tactile cues to promote proper body alignment, promote proper body posture, promote proper body mechanics and promote proper body breathing techniques. Progressed resistance, range, repetitions and complexity of movement as indicated.    Date:  22 Date:  22 Date:  22 Date:  22 Date:  22   Activity/Exercise Parameters Parameters Parameters  Parameters   NuStep 10 mins, 3.0 95%  10 mins, 3.0, 95% 10 mins, 3.0, 94 % 10 mins, 3.0  95%  10 mins, 3.0   UBE  10 mins, 1.5, 97% 10 mins, 1.5 95% 10 mins 1.5 95%  10 mins, 3.0, 94%   Sit to stand 13x 10x 96% 2 x 10 95% 2 x 10 95% 2x10 96%  17x    Mini squats 10x 2x10 96%      Marching 77x 20x B 97% 20 x B 95%  2 minutes, 100 reps within time   Bridge 10x w/ paced breathing 20x w/ breathing 96-97%      Hip abduction 10x B at counter       Hip extension 10x B at counter       Heel/toe raise 10x at counter       Diaphragmatic breathing 5 minutes 1 minute supine   5 mins working on using belly in sit rather than shoulders   Leg press  2x10 50# 2 x 10 50#     Clamshell        Walking  Interval walking in gym: 95%  On treadmill 1.5 speed 3 incline 92% after 2:30 - stopped at that time due to oxygen level walking Treadmill 2 min at 1.5 mph, 30 sec at 3% with 1 min rest at 0%, 30 sec at 5% with 1 min rest at 0%. 95% Treadmill 2 min at 1.5 mph, 30 sec at 3% with 1 min rest at 0%, 30 sec  At at 3% with 1 min rest, then  30 sec at 5% with 1.5 min rest at 0%. 95% Treadmill 1.5 speed to 0.5 speed on and off in intervals 96%    SLS   On airex with hip abd, ext 10x ea; B     Lateral step over   30 secs over cristobal     Step ups    6\" 2 x 5 B (2X)  94%    10x B 6 inch step 93%       [employer] Portal  Access Code: [email or web address]  URL: [email or web address]  Date: 01/26/2022  Prepared by: P[employer]    Exercises  Supine Diaphragmatic Breathing - 2 x daily - 7 x weekly - 1 sets - 10 reps  Mini Squat with Counter Support - 1 x daily - 3 x weekly - 2 sets - 10 reps - 5 hold  Sit to Stand without Arm Support - 1 x daily - 3 x weekly - 2 sets - 10 reps - 5 hold  Standing March with Counter Support - 1 x daily - 3 x weekly - 2 sets - 10 reps - 5 hold  Standing Hip Extension with Counter Support - 1 x daily - 3 x weekly - 2 sets - 10 reps - 5 hold  Standing Hip Abduction with Counter Support - 1 x daily - 3 x weekly - 2 sets - 10 reps - 5 hold  Heel Toe Raises with Counter Support - 1 x daily - 3 x weekly - 2 sets - 10 reps - 5 hold    Treatment/Session Summary:    · Response to Treatment: See DC note. She has met all goals and is excited to DC.      · Communication/Consultation:  None today    Total Treatment Billable Duration:  55 minutes therex  PT Patient Time In/Time Out  Time In: 1030  Time Out: [address], PT    Future Appointments   Date Time Provider Unique Lundberg   3/29/2022 11:15 AM [address] THRU [facility] IMD IMD   4/1/2022 11:15 AM PP PFT LAB [facility] PP PP   4/11/2022  2:50 PM Ivy Miranda NP [facility] PP PP   8/1/2022 10:30 AM Adilene Sosa MD [facility] FIM FIM

## 2022-03-18 PROBLEM — I95.9 LOW BLOOD PRESSURE: Status: ACTIVE | Noted: 2019-12-02

## 2022-03-19 PROBLEM — I10 HYPERTENSIVE DISORDER: Status: ACTIVE | Noted: 2018-03-05

## 2022-03-19 PROBLEM — E78.00 HYPERCHOLESTEROLEMIA: Status: ACTIVE | Noted: 2018-03-05

## 2022-03-19 PROBLEM — E89.0 POSTSURGICAL HYPOTHYROIDISM: Status: ACTIVE | Noted: 2019-05-30

## 2022-03-19 PROBLEM — M20.42 OTHER HAMMER TOE(S) (ACQUIRED), LEFT FOOT: Status: ACTIVE | Noted: 2020-12-21

## 2022-03-20 PROBLEM — Z98.890 S/P TOTAL THYROIDECTOMY: Status: ACTIVE | Noted: 2019-09-24

## 2022-03-20 PROBLEM — Z90.89 S/P TOTAL THYROIDECTOMY: Status: ACTIVE | Noted: 2019-09-24

## 2022-03-20 PROBLEM — M20.12 HALLUX VALGUS (ACQUIRED), LEFT FOOT: Status: ACTIVE | Noted: 2020-12-21

## 2022-03-20 PROBLEM — E89.0 S/P TOTAL THYROIDECTOMY: Status: ACTIVE | Noted: 2019-09-24

## 2022-04-01 ENCOUNTER — HOSPITAL ENCOUNTER (OUTPATIENT)
Dept: RESPIRATORY THERAPY | Age: 82
Discharge: HOME | End: 2022-04-01
Attending: INTERNAL MEDICINE
Payer: MEDICARE

## 2022-04-01 DIAGNOSIS — R06.09 DOE (DYSPNEA ON EXERTION): ICD-10-CM

## 2022-04-01 PROCEDURE — 94726 PLETHYSMOGRAPHY LUNG VOLUMES: CPT

## 2022-04-01 PROCEDURE — 94729 DIFFUSING CAPACITY: CPT

## 2022-04-01 PROCEDURE — 94060 EVALUATION OF WHEEZING: CPT

## 2022-04-08 ENCOUNTER — HOSPITAL ENCOUNTER (OUTPATIENT)
Dept: LAB | Age: 82
Discharge: HOME | End: 2022-04-08

## 2022-04-08 PROCEDURE — 88305 TISSUE EXAM BY PATHOLOGIST: CPT

## 2022-05-26 ENCOUNTER — TELEMEDICINE (OUTPATIENT)
Dept: INTERNAL MEDICINE CLINIC | Facility: CLINIC | Age: 82
End: 2022-05-26

## 2022-05-26 DIAGNOSIS — R05.9 COUGH: Primary | ICD-10-CM

## 2022-05-26 DIAGNOSIS — R09.81 SINUS CONGESTION: ICD-10-CM

## 2022-05-26 PROBLEM — S02.2XXA CLOSED FRACTURE OF NASAL BONE: Status: ACTIVE | Noted: 2017-06-21

## 2022-05-26 PROBLEM — U07.1 COVID: Status: ACTIVE | Noted: 2021-09-20

## 2022-05-26 PROCEDURE — 1123F ACP DISCUSS/DSCN MKR DOCD: CPT | Performed by: INTERNAL MEDICINE

## 2022-05-26 PROCEDURE — 99214 OFFICE O/P EST MOD 30 MIN: CPT | Performed by: INTERNAL MEDICINE

## 2022-05-26 RX ORDER — FLUTICASONE PROPIONATE 50 MCG
2 SPRAY, SUSPENSION (ML) NASAL DAILY
Qty: 16 G | Refills: 0 | Status: SHIPPED | OUTPATIENT
Start: 2022-05-26

## 2022-05-26 RX ORDER — AMOXICILLIN AND CLAVULANATE POTASSIUM 875; 125 MG/1; MG/1
1 TABLET, FILM COATED ORAL 2 TIMES DAILY
Qty: 14 TABLET | Refills: 0 | Status: SHIPPED | OUTPATIENT
Start: 2022-05-26 | End: 2022-06-02

## 2022-05-26 ASSESSMENT — ENCOUNTER SYMPTOMS
WHEEZING: 0
SHORTNESS OF BREATH: 1
SINUS PRESSURE: 1
RHINORRHEA: 1
COUGH: 1
CHEST TIGHTNESS: 0
SORE THROAT: 1

## 2022-05-26 NOTE — PATIENT INSTRUCTIONS
Patient Education      Patient Education        amoxicillin and clavulanate potassium  Pronunciation: am OK i SPENSER in [address] ate zaki TAS ee um  Brand: Augmentin  What is the most important information I should know about amoxicillin and clavulanate potassium? You should not use this medicine if you have severe kidney disease, if you have had liver problems or jaundice while taking amoxicillin and clavulanate potassium, or if you are allergic to any penicillin or cephalosporinantibiotic, such as Amoxil, Ceftin, Cefzil, Moxatag, Omnicef, and others. What is amoxicillin and clavulanate potassium? Amoxicillin is a penicillin antibiotic. Clavulanate potassium helps preventcertain bacteria from becoming resistant to amoxicillin. Amoxicillin and clavulanate potassium is a combination medicine used to treat many different infections caused by bacteria, such as sinusitis, pneumonia, earinfections, bronchitis, urinary tract infections, and infections of the skin. Amoxicillin and clavulanate potassium may also be used for purposes not listedin this medication guide. What should I discuss with my healthcare provider before taking amoxicillin and clavulanate potassium? You should not use this medicine if you are allergic to it, or if:   you have severe kidney disease (or if you are on dialysis);   you have had liver problems or jaundice while taking amoxicillin and clavulanate potassium; or   you are allergic to any penicillin or cephalosporin antibiotic, such as Amoxil, Ceftin, Cefzil, Moxatag, Omnicef, and others. Tell your doctor if you have ever had:   liver disease (hepatitis or jaundice);   kidney disease; or   mononucleosis. The liquid or chewable tablet may contain phenylalanine. Tell your doctor if you have phenylketonuria (PKU). Tell your doctor if you are pregnant or breastfeeding. Amoxicillin and clavulanate potassium can make birth control pills less effective.  Ask your doctor about using a non-hormonal birth control (condom,diaphragm, cervical cap, or contraceptive sponge) to prevent pregnancy. Do not give this medicine to a child without medical advice. How should I take amoxicillin and clavulanate potassium? Follow all directions on your prescription label and read all medication guidesor instruction sheets. Use the medicine exactly as directed. Amoxicillin and clavulanate potassium may work best if you take it at the [facility] a meal.  Take the medicine every 12 hours. Do not crush or chew the extended-release tablet. Swallow the pill whole, or break the pill in half and take both halves one serena time. Tell your doctor if you have trouble swallowing a whole or half pill. You must chew the chewable tablet before you swallow it. Shake the oral suspension (liquid) before you measure a dose. Use the dosing syringe provided, or use amedicine dose-measuring device (not a kitchen spoon). This medicine can affect the results of certain medical tests. Tell any doctorwho treats you that you are using amoxicillin and clavulanate potassium. Use this medicine for the full prescribed length of time, even if your symptoms quickly improve. Skipping doses can increase your risk of infection that is resistant to medication. Amoxicillin and clavulanate potassium will not treat aviral infection such as the flu or a common cold. Store the tablets at room temperature away from moisture and heat. Store the liquid in the refrigerator. Throw away any unused liquid after 10 days. What happens if I miss a dose? Take the medicine as soon as you can, but skip the missed dose if it is almost time for your next dose. Do not take two doses at one time. What happens if I overdose? Seek emergency medical attention or call the Poison Help line at 1-660.448.1705. Overdose can cause nausea, vomiting, stomach pain, diarrhea, skin rash,drowsiness, hyperactivity, and decreased urination.   What should I avoid while taking amoxicillin and clavulanate potassium? Avoid taking this medicine together with or just after eating a high-fat meal.This will make it harder for your body to absorb the medication. Antibiotic medicines can cause diarrhea, which may be a sign of a new infection. If you have diarrhea that is watery or bloody, call your doctor before using anti-diarrhea medicine. What are the possible side effects of amoxicillin and clavulanate potassium? Get emergency medical help if you have signs of an allergic reaction (hives, difficult breathing, swelling in your face or throat) or a severe skin reaction (fever, sore throat, burning eyes, skin pain, red or purple skin rash withblistering and peeling). Call your doctor at once if you have:   severe stomach pain, diarrhea that is watery or bloody (even if it occurs months after your last dose);   pale or yellowed skin, dark colored urine, fever, confusion or weakness;   loss of appetite, upper stomach pain;   little or no urination; or   easy bruising or bleeding. Common side effects may include:   nausea, vomiting; diarrhea;   rash, itching;   vaginal itching or discharge; or   diaper rash. This is not a complete list of side effects and others may occur. Call your doctor for medical advice about side effects. You may report side effects toFDA at 5-918-LGW-4791. What other drugs will affect amoxicillin and clavulanate potassium? Tell your doctor about all your other medicines, especially:   allopurinol;   probenecid; or   a blood thinner --warfarin, Coumadin, Jantoven. This list is not complete. Other drugs may affect amoxicillin and clavulanate potassium, including prescription and over-the-counter medicines, vitamins, andherbal products. Not all possible drug interactions are listed here. Where can I get more information? Your pharmacist can provide more information about amoxicillin and clavulanatepotassium.   Remember, keep this and all other medicines out of the reach of children, never share your medicines with others, and use this medication only for the indication prescribed. Every effort has been made to ensure that the information provided by [address] is accurate, up-to-date, and complete, but no guarantee is made to that effect. Drug information contained herein may be time sensitive. [facility] information has been compiled for use by healthcare practitioners and consumers in the [place] and therefore [facility] does not warrant that uses outside of the [place] are appropriate, unless specifically indicated otherwise. [facility]'s drug information does not endorse drugs, diagnose patients or recommend therapy. [facility]'s drug information is an informational resource designed to assist licensed healthcare practitioners in caring for their patients and/or to serve consumers viewing this service as a supplement to, and not a substitute for, the expertise, skill, knowledge and judgment of healthcare practitioners. The absence of a warning for a given drug or drug combination in no way should be construed to indicate that the drug or drug combination is safe, effective or appropriate for any given patient. [facility] does not assume any responsibility for any aspect of healthcare administered with the aid of information [facility] provides. The information contained herein is not intended to cover all possible uses, directions, precautions, warnings, drug interactions, allergic reactions, or adverse effects. If you have questions about the drugs you are taking, check with yourdoctor, nurse or pharmacist.  Copyright 8386-1128 [address]: 12.01. Revision date:2/24/2020. Care instructions adapted under license by [facility] ([facility]). If you have questions about a medical condition or this instruction, always ask your healthcare professional. [place] [address] any warranty or liability for your use of this information.  You do not get better as expected. Where can you learn more? Go to [email or web address] and sign in to your [employer] account. Enter D279 in the [employer] box to learn more about \"Cough: Care Instructions. \"     If you do not have an account, please click on the \"Sign Up Now\" link. Current as of: July 6, 2021               Content Version: 13.2  © 2006-2022 Healthwise, Incorporated. Care instructions adapted under license by [facility] ([facility]). If you have questions about a medical condition or this instruction, always ask your healthcare professional. [address] any warranty or liability for your use of this information.

## 2022-05-26 NOTE — PROGRESS NOTES
Gui Thompson (:  1940) is aEstablished patient, here for evaluation of the following:   Chief Complaint   Patient presents with    Cough     runny nose, fever, drainage X 3 days. Home Covid test negative. Assessment & Plan   Below is the assessment and plan developed based on review of pertinent history, physical exam, labs, studies, and medications. 1. Cough  -     amoxicillin-clavulanate (AUGMENTIN) 875-125 MG per tablet; Take 1 tablet by mouth 2 times daily for 7 days, Disp-14 tablet, R-0Normal  -     fluticasone (FLONASE) 50 MCG/ACT nasal spray; 2 sprays by Each Nostril route daily, Disp-16 g, R-0Normal  2. Sinus congestion  -     fluticasone (FLONASE) 50 MCG/ACT nasal spray; 2 sprays by Each Nostril route daily, Disp-16 g, R-0Normal    Discussed with patient to start antibiotic amoxicillin clavulanate twice a day for 7 days, Flonase for nasal congestion, increase fluid intake , tylenol or Nsaids PRN , (if not Contraindicated),     Has been instructed to follow-up with primary care if her symptoms do not get any better, if in fact this is an asthma exacerbation or COPD exacerbation, she may need corticosteroid treatment. But at this point because of mild fever, and sinus pressure, postnasal drip and cough, we decided to treat for possible sinusitis. She reports she has a follow-up appointment with her primary care coming up in few months. But has been advised to contact them if any worsening  No follow-ups on file. Subjective   Patient of Petrona Estrada MD  Chart video was attempted, I was able to see the patient for few minutes but unfortunately the sound was not working so we completed the call via phone.   Who is being seen today for acute visit, she reports cough, sore throat, runny nose, mild fever that is started 3 days ago, she had tested for COVID negative, but she has history of COVID, recently and has been seeing pulmonology, where pulmonary function test had been done showing possible asthma versus COPD. But her symptoms started to get worse over the last 3 days, she denies any increasing shortness of breath but cough, difficulty sleeping due to the cough. Review of Systems   Constitutional: Positive for fatigue and fever. Negative for chills. HENT: Positive for congestion, postnasal drip, rhinorrhea, sinus pressure and sore throat. Respiratory: Positive for cough and shortness of breath. Negative for chest tightness and wheezing. Cardiovascular: Negative for chest pain. Objective   Patient-Reported Vitals  No data recorded     Physical Exam  [INSTRUCTIONS:  \"[x]\" Indicates a positive item  \"[]\" Indicates a negative item  -- DELETE ALL ITEMS NOT EXAMINED]    Constitutional: [x] Appears well-developed and well-nourished [x] No apparent distress      [] Abnormal -     Mental status: [x] Alert and awake  [x] Oriented to person/place/time [x] Able to follow commands    [] Abnormal -     Eyes:   EOM    [x]  Normal    [] Abnormal -   Sclera  [x]  Normal    [] Abnormal -          Discharge [x]  None visible   [] Abnormal -     HENT: [x] Normocephalic, atraumatic  [] Abnormal -   [x] Mouth/Throat: Mucous membranes are moist    External Ears [x] Normal  [] Abnormal -         Pulmonary/Chest: [x] Respiratory effort normal    she was having coughing spells, was able to speak full sentences without getting short of breath but cough. Also had nasal voice           Other pertinent observable physical exam findings:-             On this date 5/26/2022 I have spent 13 minutes reviewing previous notes, test results and face to face (virtual) with the patient discussing the diagnosis and importance of compliance with the treatment plan as well as documenting on the day of the visit. Keo Carpenter, was evaluated through a synchronous (real-time) audio-video encounter.  The patient (or guardian if applicable) is aware that this is a billable service, which includes applicable co-pays. This Virtual Visit was conducted with patient's (and/or legal guardian's) consent. The visit was conducted pursuant to the emergency declaration under the [facility], [address] [place] [address] authority and the [email or web address] and [employer] General Act. Patient identification was verified, and a caregiver was present when appropriate. The patient was located at Home: [address] [place] [address] [address]  [address] [facility]. Provider was located at [facility] (Appt Dept): [address] [place] [address],  [address] [facility].         --Vaibhav Lucas MD

## 2022-06-24 ENCOUNTER — TELEPHONE (OUTPATIENT)
Dept: PULMONOLOGY | Age: 82
End: 2022-06-24

## 2022-06-24 NOTE — TELEPHONE ENCOUNTER
Patient states that her inhaler is back ordered at [facility].      Fluticasone-Salmeterol 113-14 MCG/ACT AEPB     Asking if there is something else that she can use that would not be too expensive. (She does not have medicine coverage)

## 2022-06-26 NOTE — TELEPHONE ENCOUNTER
Alternatives would be:  Breo 100, one puff qd  Symbicort 80, 2 puffs bid  Dulera 100, 2 puffs bid  Advair 250/50, 1 puff bid  Wixela 250/50, 1 puff bid  Advair /21, 2 puffs bid    Any of these would be appropriate.

## 2022-06-27 NOTE — TELEPHONE ENCOUNTER
Sent message to patient via [employer] ([employer]) with the recommended inhalers.   Stated in the message to please check her formulary and notify us which inhaler is most cost efficient. // Kristin Oms

## 2022-07-12 ENCOUNTER — CLINICAL DOCUMENTATION (OUTPATIENT)
Dept: INTERNAL MEDICINE CLINIC | Facility: CLINIC | Age: 82
End: 2022-07-12

## 2022-08-01 ENCOUNTER — OFFICE VISIT (OUTPATIENT)
Dept: INTERNAL MEDICINE CLINIC | Facility: CLINIC | Age: 82
End: 2022-08-01
Payer: MEDICARE

## 2022-08-01 VITALS
HEIGHT: 64 IN | RESPIRATION RATE: 17 BRPM | HEART RATE: 65 BPM | WEIGHT: 155.6 LBS | BODY MASS INDEX: 26.56 KG/M2 | SYSTOLIC BLOOD PRESSURE: 147 MMHG | OXYGEN SATURATION: 99 % | TEMPERATURE: 97.2 F | DIASTOLIC BLOOD PRESSURE: 67 MMHG

## 2022-08-01 DIAGNOSIS — Z79.899 ENCOUNTER FOR LONG-TERM (CURRENT) DRUG USE: ICD-10-CM

## 2022-08-01 DIAGNOSIS — J44.9 CHRONIC OBSTRUCTIVE PULMONARY DISEASE, UNSPECIFIED COPD TYPE (HCC): ICD-10-CM

## 2022-08-01 DIAGNOSIS — G60.9 HEREDITARY AND IDIOPATHIC PERIPHERAL NEUROPATHY: ICD-10-CM

## 2022-08-01 DIAGNOSIS — E78.00 HYPERCHOLESTEROLEMIA: ICD-10-CM

## 2022-08-01 DIAGNOSIS — I10 HYPERTENSION, UNSPECIFIED TYPE: Primary | ICD-10-CM

## 2022-08-01 DIAGNOSIS — M19.91 PRIMARY OSTEOARTHRITIS, UNSPECIFIED SITE: ICD-10-CM

## 2022-08-01 DIAGNOSIS — E89.0 POSTSURGICAL HYPOTHYROIDISM: ICD-10-CM

## 2022-08-01 DIAGNOSIS — D71 GRANULOMATOUS DISEASE, CHRONIC (HCC): ICD-10-CM

## 2022-08-01 LAB
ALBUMIN SERPL-MCNC: 3.8 G/DL (ref 3.2–4.6)
ALBUMIN/GLOB SERPL: 1.2 {RATIO} (ref 1.2–3.5)
ALP SERPL-CCNC: 72 U/L (ref 50–136)
ALT SERPL-CCNC: 33 U/L (ref 12–65)
ANION GAP SERPL CALC-SCNC: 4 MMOL/L (ref 7–16)
AST SERPL-CCNC: 28 U/L (ref 15–37)
BILIRUB SERPL-MCNC: 0.4 MG/DL (ref 0.2–1.1)
BUN SERPL-MCNC: 20 MG/DL (ref 8–23)
CALCIUM SERPL-MCNC: 9.1 MG/DL (ref 8.3–10.4)
CHLORIDE SERPL-SCNC: 107 MMOL/L (ref 98–107)
CHOLEST SERPL-MCNC: 175 MG/DL
CO2 SERPL-SCNC: 30 MMOL/L (ref 21–32)
CREAT SERPL-MCNC: 1 MG/DL (ref 0.6–1)
GLOBULIN SER CALC-MCNC: 3.3 G/DL (ref 2.3–3.5)
GLUCOSE SERPL-MCNC: 92 MG/DL (ref 65–100)
HDLC SERPL-MCNC: 56 MG/DL (ref 40–60)
HDLC SERPL: 3.1 {RATIO}
LDLC SERPL CALC-MCNC: 104.8 MG/DL
POTASSIUM SERPL-SCNC: 4 MMOL/L (ref 3.5–5.1)
PROT SERPL-MCNC: 7.1 G/DL (ref 6.3–8.2)
SODIUM SERPL-SCNC: 141 MMOL/L (ref 136–145)
TRIGL SERPL-MCNC: 71 MG/DL (ref 35–150)
TSH W FREE THYROID IF ABNORMAL: 1.78 UIU/ML (ref 0.36–3.74)
VLDLC SERPL CALC-MCNC: 14.2 MG/DL (ref 6–23)

## 2022-08-01 PROCEDURE — 3023F SPIROM DOC REV: CPT | Performed by: INTERNAL MEDICINE

## 2022-08-01 PROCEDURE — G8399 PT W/DXA RESULTS DOCUMENT: HCPCS | Performed by: INTERNAL MEDICINE

## 2022-08-01 PROCEDURE — G8417 CALC BMI ABV UP PARAM F/U: HCPCS | Performed by: INTERNAL MEDICINE

## 2022-08-01 PROCEDURE — 1123F ACP DISCUSS/DSCN MKR DOCD: CPT | Performed by: INTERNAL MEDICINE

## 2022-08-01 PROCEDURE — 1036F TOBACCO NON-USER: CPT | Performed by: INTERNAL MEDICINE

## 2022-08-01 PROCEDURE — 1090F PRES/ABSN URINE INCON ASSESS: CPT | Performed by: INTERNAL MEDICINE

## 2022-08-01 PROCEDURE — 99214 OFFICE O/P EST MOD 30 MIN: CPT | Performed by: INTERNAL MEDICINE

## 2022-08-01 PROCEDURE — G8427 DOCREV CUR MEDS BY ELIG CLIN: HCPCS | Performed by: INTERNAL MEDICINE

## 2022-08-01 SDOH — ECONOMIC STABILITY: FOOD INSECURITY: WITHIN THE PAST 12 MONTHS, THE FOOD YOU BOUGHT JUST DIDN'T LAST AND YOU DIDN'T HAVE MONEY TO GET MORE.: NEVER TRUE

## 2022-08-01 SDOH — ECONOMIC STABILITY: FOOD INSECURITY: WITHIN THE PAST 12 MONTHS, YOU WORRIED THAT YOUR FOOD WOULD RUN OUT BEFORE YOU GOT MONEY TO BUY MORE.: NEVER TRUE

## 2022-08-01 ASSESSMENT — ENCOUNTER SYMPTOMS
NAUSEA: 0
CONSTIPATION: 0
WHEEZING: 0
SHORTNESS OF BREATH: 0
COUGH: 0
DIARRHEA: 0
BLOOD IN STOOL: 0
VOMITING: 0

## 2022-08-01 ASSESSMENT — PATIENT HEALTH QUESTIONNAIRE - PHQ9
SUM OF ALL RESPONSES TO PHQ QUESTIONS 1-9: 0
SUM OF ALL RESPONSES TO PHQ9 QUESTIONS 1 & 2: 0
2. FEELING DOWN, DEPRESSED OR HOPELESS: 0
1. LITTLE INTEREST OR PLEASURE IN DOING THINGS: 0

## 2022-08-01 ASSESSMENT — SOCIAL DETERMINANTS OF HEALTH (SDOH): HOW HARD IS IT FOR YOU TO PAY FOR THE VERY BASICS LIKE FOOD, HOUSING, MEDICAL CARE, AND HEATING?: NOT HARD AT ALL

## 2022-08-01 NOTE — PROGRESS NOTES
2022 6:27 PM  Location:[facility] [address] [facility]  [place]  Patient #:  [id number]  YOB: 1940            History of Present Illness     Chief Complaint   Patient presents with    Follow-up     6 Month: no new complaints, no refills needed. Hypertension    Cholesterol Problem       Ms. Otilia Alpers is a [de-identified] y.o. female  who presents for follow up on chronic medical problems. Patient notes she is feeling well. No new complaints. Notes that her breathing has returned to normal.  Secondary to her arthritis, she had to stop playing tennis but is considering playing pickle ball. Other  Associated symptoms include arthralgias. Pertinent negatives include no chest pain, coughing, nausea or vomiting.         Allergies   Allergen Reactions    Sulfa Antibiotics Nausea Only and Other (See Comments)     dizzy     Past Medical History:   Diagnosis Date    Actinic keratosis 2015    Arthritis     Benign neoplasm of colon 2015    Cough     COVID 2021    Diverticulitis of colon (without mention of hemorrhage)(562.11) 2015    Diverticulosis of colon (without mention of hemorrhage) 2015    Encounter for long-term (current) use of other medications 2015    Essential hypertension, benign 2015    on med for control     Former smoker     GERD (gastroesophageal reflux disease)     prn med     Nausea & vomiting     post-op N/V    Postmenopausal atrophic vaginitis 2015    PUD (peptic ulcer disease)     hx; no current problems     Pure hypercholesterolemia 2015    on med    Thyroid nodule      Social History     Socioeconomic History    Marital status:      Spouse name: None    Number of children: None    Years of education: None    Highest education level: None   Tobacco Use    Smoking status: Former     Packs/day: 1.00     Years: 40.00     Pack years: 40.00     Types: Cigarettes     Quit date: 2006     Years since quittin.5 Smokeless tobacco: Never   Substance and Sexual Activity    Alcohol use: Yes    Drug use: No   Social History Narrative    Retired . Has lived in [facility]. Has one dog. Social Determinants of Health     Financial Resource Strain: Low Risk     Difficulty of Paying Living Expenses: Not hard at all   Food Insecurity: No Food Insecurity    Worried About [address] in the Last Year: Never true    Ran Out of Food in the Last Year: Never true     Past Surgical History:   Procedure Laterality Date    APPENDECTOMY      BACK SURGERY  1995    lumbar    CATARACT REMOVAL Bilateral     HYSTERECTOMY (CERVIX STATUS UNKNOWN)      ROTATOR CUFF REPAIR Right 5/2013     Current Outpatient Medications   Medication Sig Dispense Refill    fluticasone (FLONASE) 50 MCG/ACT nasal spray 2 sprays by Each Nostril route daily 16 g 0    BIOTIN PO Take 1 capsule by mouth daily      OXYGEN 2 LPM QHS      albuterol sulfate  (90 Base) MCG/ACT inhaler Inhale 1 puff into the lungs every 4 hours as needed      aspirin 81 MG EC tablet Take 81 mg by mouth daily      Calcium Carb-Cholecalciferol 600-400 MG-UNIT CAPS Take 1 tablet by mouth daily      esomeprazole (NEXIUM) 20 MG delayed release capsule Take 20 mg by mouth daily      Fluticasone-Salmeterol 113-14 MCG/ACT AEPB Inhale 1 puff into the lungs 2 times daily      hydroCHLOROthiazide (HYDRODIURIL) 12.5 MG tablet TAKE ONE TABLET BY MOUTH EVERY DAY      levothyroxine (SYNTHROID) 75 MCG tablet TAKE ONE TABLET BY MOUTH ON Tuesday, Thursday, Saturday, AND SUNDAY      levothyroxine (SYNTHROID) 88 MCG tablet Take 88 mcg by mouth      losartan (COZAAR) 50 MG tablet TAKE ONE TABLET BY MOUTH EVERY DAY      lovastatin (MEVACOR) 20 MG tablet TAKE ONE TABLET BY MOUTH EVERY DAY AT NIGHT      omeprazole (PRILOSEC OTC) 20 MG tablet Take 20 mg by mouth daily       No current facility-administered medications for this visit.      Health Maintenance   Topic Date Due    Flu vaccine (1) 09/01/2022    COVID-19 Vaccine (5 - Booster for Pfizer series) 11/11/2022    Depression Screen  01/27/2023    Annual Wellness Visit (AWV)  01/28/2023    Lipids  08/01/2023    Breast cancer screen  04/18/2024    DTaP/Tdap/Td vaccine (2 - Td or Tdap) 12/16/2029    DEXA (modify frequency per FRAX score)  Completed    Pneumococcal 65+ years Vaccine  Completed    Hepatitis A vaccine  Aged Out    Hepatitis B vaccine  Aged Out    Hib vaccine  Aged Out    Meningococcal (ACWY) vaccine  Aged Out     Family History   Problem Relation Age of Onset    Other Brother         has one kidney    Heart Disease Brother     Diabetes Brother     Lupus Sister     Thyroid Cancer Neg Hx     Osteoporosis Sister     Liver Disease Sister     Heart Disease Mother     Cancer Mother         colon    Stroke Father     Diabetes Sister     Hypertension Sister     Thyroid Disease Neg Hx              Review of Systems  Review of Systems   Respiratory:  Negative for cough, shortness of breath and wheezing. Cardiovascular:  Negative for chest pain, palpitations and leg swelling. Gastrointestinal:  Negative for blood in stool, constipation, diarrhea, nausea and vomiting. Musculoskeletal:  Positive for arthralgias. BP (!) 147/67 (Site: Left Upper Arm, Position: Sitting, Cuff Size: Large Adult)   Pulse 65   Temp 97.2 °F (36.2 °C) (Skin)   Resp 17   Ht 5' 3.5\" (1.613 m)   Wt 155 lb 9.6 oz (70.6 kg)   SpO2 99%   BMI 27.13 kg/m²       Physical Exam    Physical Exam  Constitutional:       Appearance: Normal appearance. She is not ill-appearing. HENT:      Head: Normocephalic. Neck:      Vascular: No carotid bruit. Cardiovascular:      Rate and Rhythm: Normal rate and regular rhythm. Pulmonary:      Effort: Pulmonary effort is normal.      Breath sounds: Normal breath sounds. No wheezing. Abdominal:      General: Abdomen is flat. Palpations: Abdomen is soft. Tenderness: There is no abdominal tenderness.    Musculoskeletal: Right lower leg: No edema. Left lower leg: No edema. Neurological:      Mental Status: She is alert and oriented to person, place, and time. Deep Tendon Reflexes:      Reflex Scores:       Patellar reflexes are 2+ on the right side and 2+ on the left side. Psychiatric:         Mood and Affect: Mood normal.         Behavior: Behavior normal.         Assessment & Plan    Current Outpatient Medications   Medication Sig Dispense Refill    fluticasone (FLONASE) 50 MCG/ACT nasal spray 2 sprays by Each Nostril route daily 16 g 0    BIOTIN PO Take 1 capsule by mouth daily      OXYGEN 2 LPM QHS      albuterol sulfate  (90 Base) MCG/ACT inhaler Inhale 1 puff into the lungs every 4 hours as needed      aspirin 81 MG EC tablet Take 81 mg by mouth daily      Calcium Carb-Cholecalciferol 600-400 MG-UNIT CAPS Take 1 tablet by mouth daily      esomeprazole (NEXIUM) 20 MG delayed release capsule Take 20 mg by mouth daily      Fluticasone-Salmeterol 113-14 MCG/ACT AEPB Inhale 1 puff into the lungs 2 times daily      hydroCHLOROthiazide (HYDRODIURIL) 12.5 MG tablet TAKE ONE TABLET BY MOUTH EVERY DAY      levothyroxine (SYNTHROID) 75 MCG tablet TAKE ONE TABLET BY MOUTH ON Tuesday, Thursday, Saturday, AND SUNDAY      levothyroxine (SYNTHROID) 88 MCG tablet Take 88 mcg by mouth      losartan (COZAAR) 50 MG tablet TAKE ONE TABLET BY MOUTH EVERY DAY      lovastatin (MEVACOR) 20 MG tablet TAKE ONE TABLET BY MOUTH EVERY DAY AT NIGHT      omeprazole (PRILOSEC OTC) 20 MG tablet Take 20 mg by mouth daily       No current facility-administered medications for this visit.        Orders Placed This Encounter   Procedures    Lipid Panel     Standing Status:   Future     Number of Occurrences:   1     Standing Expiration Date:   8/1/2023    Comprehensive Metabolic Panel     Standing Status:   Future     Number of Occurrences:   1     Standing Expiration Date:   8/1/2023    TSH with Reflex     Standing Status:   Future Number of Occurrences:   1     Standing Expiration Date:   8/1/2023       No orders of the defined types were placed in this encounter. There are no discontinued medications. Diagnosis Orders   1. Hypertension, unspecified type        2. Encounter for long-term (current) drug use        3. Hypercholesterolemia  Lipid Panel    Comprehensive Metabolic Panel    Comprehensive Metabolic Panel    Lipid Panel      4. Postsurgical hypothyroidism  TSH with Reflex    TSH with Reflex      5. Chronic obstructive pulmonary disease, unspecified COPD type ([facility] [address]        6. Hereditary and idiopathic peripheral neuropathy        7. Granulomatous disease, chronic ([facility] [address]        8. Primary osteoarthritis, unspecified site           Is doing fairly well physically and emotionally. Notes that her BP at home is controlled. No doubt 'white coat hypertension' with higher in-office readings than home readings. Document BP twice weekly. Contact office if not <=130/80 consistently. Return in about 6 months (around 2/1/2023) for GAURAV HARE.           Deepthi Felton MD

## 2022-10-10 NOTE — PROGRESS NOTES
Patient Name:  Michelle Hancock                             YOB: 1940  MRN: [id number]                                              Office Visit 10/11/2022    ASSESSMENT AND PLAN:  (Medical Decision Making)    Kay Overton was seen today for copd. Diagnoses and all orders for this visit:    Chronic obstructive pulmonary disease, unspecified COPD type ([facility]  --moderate obstruction on CPFTs with significant BD response. On AirDuo bid. Spirometry has improved. Continue current therapy. -     Spirometry Without Bronchodilator  -     DME - DURABLE MEDICAL EQUIPMENT    Post covid-19 condition, unspecified  --WHITMAN has returned to baseline. Nocturnal hypoxemia  Has been on O2 at 2 lpm qhs since January, 2022. Feels that she has improved. Will repeat MARYBETH on room air.  -     DME - DURABLE MEDICAL EQUIPMENT    No orders of the defined types were placed in this encounter. Procedures    DME - DURABLE MEDICAL EQUIPMENT     Please send out Overnight Oximetry on Room Air  Fax to [phone]    Please mail to patient--she is [de-identified]years old and  is 82years old and they don't drive in traffic. Follow-up and Dispositions    Return in about 6 months (around 4/11/2023) for Toño Mcclendon or MD, COPD, Arrive 15 minutes prior to appt time. Collaborating physician is Dr. Nehemiah Hubbard. ANH Miles, NP, APRN - CNP    _________________________________________________________________________    HISTORY OF PRESENT ILLNESS:    Ms. Agueda Sawant is a [de-identified] y.o. female who is seen at [address] today for  COPD     The patient is an [de-identified]year old female who is seen for follow up of emphysema/COPD and dyspnea on exertion post Covid (diagnosed September, 2021). She has a history of thyroid  nodule, emphysema, lung nodules, hyperlipidemia, peptic ulcer disease, GERD, HTN, and arthritis. She has a 40 pack year history of cigarette smoking, but quit smoking in 2006.   Had some WHITMAN prior to Covid, but this has worsened since that time. She had negative CTA of chest in July. 2021 for PE. Does have some small nodules in RUL, RLL, and LLL as well as a calcified granuloma on the right. Has moderate obstruction on CPFTs with 19% improvement with BD. Remains on AirDuo. Reports resolution of dyspnea. No cough or wheezing. Is on O2 at 2 lpm with sleep, but doesn't think she needs it any longer. REVIEW OF SYSTEMS: 10 point review of systems is negative except as reported in HPI. PHYSICAL EXAM: Body mass index is 26.89 kg/m². Vitals:    10/11/22 1432   BP: 132/70   Pulse: (!) 102   Resp: 17   Temp: 97.1 °F (36.2 °C)   SpO2: 94%   Weight: 151 lb 12.8 oz (68.9 kg)   Height: 5' 3\" (1.6 m)         General:   Alert, cooperative, no distress, appears stated age. Eyes:   Conjunctivae/corneas clear. PERRL        Mouth/Throat:  Lips, mucosa, and tongue normal. Teeth and gums normal.        Lungs:     Breath sounds clear bilaterally. Heart:   Regular rate and rhythm, S1, S2 normal, no murmur, click, rub or gallop. Abdomen:    Soft, non-tender. Extremities:  Extremities normal, atraumatic, no cyanosis or edema. Skin:  Skin color normal. No rashes or lesions     Neurologic:  A&Ox3     DIAGNOSTIC TESTS:                                                                                    LABS:   Lab Results   Component Value Date/Time    WBC 5.9 01/27/2022 12:15 PM    HGB 12.5 01/27/2022 12:15 PM    HCT 37.3 01/27/2022 12:15 PM     01/27/2022 12:15 PM    TSH 4.600 01/27/2022 12:15 PM     Imaging: I performed an independent interpretation of the patient's images.   CXR:   XR CHEST STANDARD TWO VW 01/10/2022    Narrative  EXAMINATION: CHEST RADIOGRAPH 1/10/2022 12:23 PM    ACCESSION NUMBER: [id number]    INDICATION: SOB (shortness of breath), covid in sept    COMPARISON: Cardiac CT 2/16/2010, right shoulder x-rays 4/12/2018    TECHNIQUE: PA  and lateral views of the chest were obtained. FINDINGS:    Cardiac Silhouette: Within normal limits in size. Lungs: Multiple calcified granulomas overlie the right lung. No superimposed  focal airspace disease. Pleura: No pleural effusion. No pneumothorax. Minimal biapical pleural  thickening. Osseous Structures: Thoracic spine spondylosis. Upper Abdomen: Scattered atherosclerosis. Impression  1. No evidence of acute cardiopulmonary disease. 2. Evidence of prior granulomatous disease, similar to prior exams. VOICE DICTATED BY: Dr. Apollo Mitchell    CT Chest:   CT CHEST W CONTRAST     Nuclear Medicine: No results found for this or any previous visit from the past 3650 days. PFTs:   Office Spirometry Results Latest Ref Rng & Units 10/11/2022   FVC L 1.98   FEV1 L 1.34   FEV1 %PRED-PRE % 72   FVC %PRED-PRE % 79   FEV1/FVC % 68       Echo:   TRANSTHORACIC ECHOCARDIOGRAM (TTE) COMPLETE (CONTRAST/BUBBLE/3D PRN) 02/16/2022    Narrative  This is a summary report. The complete report is available in the patient's medical record. If you cannot access the medical record, please contact the sending organization for a detailed fax or copy. Left Ventricle: Left ventricle size is normal. Normal wall thickness. Normal wall motion. Normal left ventricular systolic function with a visually estimated EF of 60 - 65%. Normal diastolic function. Aortic Valve: Mild sclerosis of the aortic valve cusp.     [facility] Reference Info:                                                                                                                  Past Medical History:   Diagnosis Date    Actinic keratosis 7/24/2015    Arthritis     Benign neoplasm of colon 7/24/2015    Cough     COVID 09/2021    Diverticulitis of colon (without mention of hemorrhage)(562.11) 7/24/2015    Diverticulosis of colon (without mention of hemorrhage) 7/24/2015    Encounter for long-term (current) use of other medications 7/24/2015    Essential hypertension, benign 7/24/2015 on med for control     Former smoker     GERD (gastroesophageal reflux disease)     prn med     Nausea & vomiting     post-op N/V    Postmenopausal atrophic vaginitis 2015    PUD (peptic ulcer disease)     hx; no current problems     Pure hypercholesterolemia 2015    on med    Thyroid nodule         Tobacco Use      Smoking status: Former        Packs/day: 1.00        Years: 40.00        Pack years: 40        Types: Cigarettes        Quit date: 2006        Years since quittin.7      Smokeless tobacco: Never    Allergies   Allergen Reactions    Sulfa Antibiotics Nausea Only and Other (See Comments)     dizzy     Current Outpatient Medications   Medication Instructions    albuterol sulfate  (90 Base) MCG/ACT inhaler 1 puff, Inhalation, EVERY 4 HOURS PRN    aspirin 81 mg, Oral, DAILY    BIOTIN PO 1 capsule, Oral, DAILY    Calcium Carb-Cholecalciferol 600-400 MG-UNIT CAPS 1 tablet, Oral, DAILY    esomeprazole (NEXIUM) 20 mg, Oral, DAILY    fluticasone (FLONASE) 50 MCG/ACT nasal spray 2 sprays, Each Nostril, DAILY    Fluticasone-Salmeterol 113-14 MCG/ACT AEPB 1 puff, Inhalation, 2 TIMES DAILY    hydroCHLOROthiazide (HYDRODIURIL) 12.5 MG tablet TAKE ONE TABLET BY MOUTH EVERY DAY    levothyroxine (SYNTHROID) 75 MCG tablet TAKE ONE TABLET BY MOUTH ON Tuesday, Thursday, Saturday, AND     levothyroxine (SYNTHROID) 88 mcg, Oral    losartan (COZAAR) 50 MG tablet TAKE ONE TABLET BY MOUTH EVERY DAY    lovastatin (MEVACOR) 20 MG tablet TAKE ONE TABLET BY MOUTH EVERY DAY AT NIGHT    omeprazole (PRILOSEC OTC) 20 mg, Oral, DAILY    OXYGEN 2 LPM QHS

## 2022-10-11 ENCOUNTER — OFFICE VISIT (OUTPATIENT)
Dept: PULMONOLOGY | Age: 82
End: 2022-10-11
Payer: MEDICARE

## 2022-10-11 VITALS
OXYGEN SATURATION: 94 % | RESPIRATION RATE: 17 BRPM | HEART RATE: 102 BPM | SYSTOLIC BLOOD PRESSURE: 132 MMHG | DIASTOLIC BLOOD PRESSURE: 70 MMHG | WEIGHT: 151.8 LBS | BODY MASS INDEX: 26.9 KG/M2 | TEMPERATURE: 97.1 F | HEIGHT: 63 IN

## 2022-10-11 DIAGNOSIS — G47.34 NOCTURNAL HYPOXEMIA: ICD-10-CM

## 2022-10-11 DIAGNOSIS — U09.9 POST COVID-19 CONDITION, UNSPECIFIED: ICD-10-CM

## 2022-10-11 DIAGNOSIS — J44.9 CHRONIC OBSTRUCTIVE PULMONARY DISEASE, UNSPECIFIED COPD TYPE (HCC): Primary | ICD-10-CM

## 2022-10-11 LAB
EXPIRATORY TIME: NORMAL
FEF 25-75% %PRED-PRE: NORMAL
FEF 25-75% PRED: NORMAL
FEF 25-75%-PRE: NORMAL
FEV1 %PRED-PRE: 72 %
FEV1 PRED: 1.85 L
FEV1/FVC %PRED-PRE: NORMAL
FEV1/FVC PRED: NORMAL
FEV1/FVC: 68 %
FEV1: 1.34 L
FVC %PRED-PRE: 79 %
FVC PRED: 2.49 L
FVC: 1.98 L
PEF %PRED-PRE: NORMAL
PEF PRED: NORMAL
PEF-PRE: NORMAL

## 2022-10-11 PROCEDURE — G8417 CALC BMI ABV UP PARAM F/U: HCPCS | Performed by: NURSE PRACTITIONER

## 2022-10-11 PROCEDURE — 94010 BREATHING CAPACITY TEST: CPT | Performed by: NURSE PRACTITIONER

## 2022-10-11 PROCEDURE — 3023F SPIROM DOC REV: CPT | Performed by: NURSE PRACTITIONER

## 2022-10-11 PROCEDURE — 99214 OFFICE O/P EST MOD 30 MIN: CPT | Performed by: NURSE PRACTITIONER

## 2022-10-11 PROCEDURE — G8427 DOCREV CUR MEDS BY ELIG CLIN: HCPCS | Performed by: NURSE PRACTITIONER

## 2022-10-11 PROCEDURE — 1090F PRES/ABSN URINE INCON ASSESS: CPT | Performed by: NURSE PRACTITIONER

## 2022-10-11 PROCEDURE — G8484 FLU IMMUNIZE NO ADMIN: HCPCS | Performed by: NURSE PRACTITIONER

## 2022-10-11 PROCEDURE — G8399 PT W/DXA RESULTS DOCUMENT: HCPCS | Performed by: NURSE PRACTITIONER

## 2022-10-11 PROCEDURE — 1123F ACP DISCUSS/DSCN MKR DOCD: CPT | Performed by: NURSE PRACTITIONER

## 2022-10-11 PROCEDURE — 1036F TOBACCO NON-USER: CPT | Performed by: NURSE PRACTITIONER

## 2022-10-11 ASSESSMENT — PULMONARY FUNCTION TESTS
FVC_PREDICTED: 2.49
FVC_PERCENT_PREDICTED_PRE: 79
FEV1: 1.34
FEV1_PERCENT_PREDICTED_PRE: 72
FEV1/FVC: 68
FEV1_PREDICTED: 1.85
FVC: 1.98

## 2022-10-27 ENCOUNTER — TELEPHONE (OUTPATIENT)
Dept: PULMONOLOGY | Age: 82
End: 2022-10-27

## 2022-10-27 NOTE — TELEPHONE ENCOUNTER
Naomi needs a prescription to stop her oxygen with the DME company and also so that they will come  the tanks , concentrator etc

## 2022-11-03 NOTE — TELEPHONE ENCOUNTER
I see where MARYBETH was ordered, but I don't see the results. Not under media. So if she hasn't had the MARYBETH on room air, needs to have this. If she has had it, and she refuses the O2, can D/C with AMA order. Thanks.

## 2022-11-03 NOTE — TELEPHONE ENCOUNTER
Payal Mchugh, patient was last seen on 10/11/22. It looks like we did an MARYBETH on her and she qualified for Oxygen. Will we need to do another MARYBETH to confirmed she does not need the oxygen anymore or do we just have her sign an AMA with the DME company?  Please advise. // Tanisha Urban

## 2022-11-07 NOTE — TELEPHONE ENCOUNTER
Spoke with Cathy Razo from [facility] and she stated that the patient still has their MARYBETH device and that they will call her to see if she will return it so we can get the results.  // Owen Kendrick

## 2022-11-17 ENCOUNTER — TELEPHONE (OUTPATIENT)
Dept: PULMONOLOGY | Age: 82
End: 2022-11-17

## 2022-11-17 RX ORDER — LEVOTHYROXINE SODIUM 88 UG/1
TABLET ORAL
Qty: 30 TABLET | Refills: 11 | Status: SHIPPED | OUTPATIENT
Start: 2022-11-17

## 2022-11-17 NOTE — TELEPHONE ENCOUNTER
----- Message from Sujey Mansfield MD sent at 11/16/2022  6:43 PM EST -----  MARYBETH on RA reveals ongoing hypoxemia with over an hour with saturations < 89%. Recommend resumption of oxygen at 2 lpm with sleep. Please let the pt know. Austin Metz MD    I have notified patient of results of MARYBETH per Dr Miguel Ángel Pittman. She is aware that she needs to continue to wear O2 at 2 lpm HS.

## 2022-12-29 RX ORDER — OMEPRAZOLE 20 MG/1
20 TABLET, DELAYED RELEASE ORAL DAILY
Qty: 90 TABLET | Refills: 3 | Status: SHIPPED | OUTPATIENT
Start: 2022-12-29

## 2022-12-29 NOTE — TELEPHONE ENCOUNTER
Medication Refill Request      Name of Medication : omeprazole       Strength of Medication: 20 mg      Directions: take 20 mg by mouth daily      30 day or 90 day supply: 90      [facility] [address]    Additional Information For Provider:    She has been out for two days.

## 2023-01-25 RX ORDER — HYDROCHLOROTHIAZIDE 12.5 MG/1
TABLET ORAL
Qty: 90 TABLET | Refills: 3 | Status: SHIPPED | OUTPATIENT
Start: 2023-01-25

## 2023-02-02 ENCOUNTER — OFFICE VISIT (OUTPATIENT)
Dept: INTERNAL MEDICINE CLINIC | Facility: CLINIC | Age: 83
End: 2023-02-02

## 2023-02-02 VITALS
RESPIRATION RATE: 18 BRPM | HEIGHT: 63 IN | OXYGEN SATURATION: 98 % | BODY MASS INDEX: 27.46 KG/M2 | DIASTOLIC BLOOD PRESSURE: 68 MMHG | WEIGHT: 155 LBS | SYSTOLIC BLOOD PRESSURE: 129 MMHG | HEART RATE: 86 BPM

## 2023-02-02 DIAGNOSIS — M17.12 ARTHRITIS OF LEFT KNEE: ICD-10-CM

## 2023-02-02 DIAGNOSIS — M47.812 CERVICAL ARTHRITIS: ICD-10-CM

## 2023-02-02 DIAGNOSIS — D71 GRANULOMATOUS DISEASE, CHRONIC (HCC): ICD-10-CM

## 2023-02-02 DIAGNOSIS — E78.00 PURE HYPERCHOLESTEROLEMIA: Primary | ICD-10-CM

## 2023-02-02 DIAGNOSIS — J44.9 CHRONIC OBSTRUCTIVE PULMONARY DISEASE, UNSPECIFIED COPD TYPE (HCC): ICD-10-CM

## 2023-02-02 DIAGNOSIS — Z00.00 MEDICARE ANNUAL WELLNESS VISIT, SUBSEQUENT: ICD-10-CM

## 2023-02-02 DIAGNOSIS — M20.12 HALLUX VALGUS (ACQUIRED), LEFT FOOT: ICD-10-CM

## 2023-02-02 DIAGNOSIS — E89.0 POSTSURGICAL HYPOTHYROIDISM: ICD-10-CM

## 2023-02-02 DIAGNOSIS — Z12.31 SCREENING MAMMOGRAM, ENCOUNTER FOR: ICD-10-CM

## 2023-02-02 DIAGNOSIS — I10 PRIMARY HYPERTENSION: ICD-10-CM

## 2023-02-02 PROBLEM — S02.2XXA CLOSED FRACTURE OF NASAL BONE: Status: RESOLVED | Noted: 2017-06-21 | Resolved: 2023-02-02

## 2023-02-02 LAB
ALBUMIN SERPL-MCNC: 3.8 G/DL (ref 3.2–4.6)
ALBUMIN/GLOB SERPL: 1.2 (ref 0.4–1.6)
ALP SERPL-CCNC: 63 U/L (ref 50–136)
ALT SERPL-CCNC: 34 U/L (ref 12–65)
ANION GAP SERPL CALC-SCNC: 5 MMOL/L (ref 2–11)
APPEARANCE UR: CLEAR
AST SERPL-CCNC: 28 U/L (ref 15–37)
BACTERIA URNS QL MICRO: NEGATIVE /HPF
BASOPHILS # BLD: 0.1 K/UL (ref 0–0.2)
BASOPHILS NFR BLD: 1 % (ref 0–2)
BILIRUB SERPL-MCNC: 0.5 MG/DL (ref 0.2–1.1)
BILIRUB UR QL: NEGATIVE
BUN SERPL-MCNC: 21 MG/DL (ref 8–23)
CALCIUM SERPL-MCNC: 9.3 MG/DL (ref 8.3–10.4)
CASTS URNS QL MICRO: ABNORMAL /LPF (ref 0–2)
CHLORIDE SERPL-SCNC: 104 MMOL/L (ref 101–110)
CHOLEST SERPL-MCNC: 182 MG/DL
CO2 SERPL-SCNC: 29 MMOL/L (ref 21–32)
COLOR UR: ABNORMAL
CREAT SERPL-MCNC: 1.1 MG/DL (ref 0.6–1)
DIFFERENTIAL METHOD BLD: ABNORMAL
EOSINOPHIL # BLD: 0.4 K/UL (ref 0–0.8)
EOSINOPHIL NFR BLD: 5 % (ref 0.5–7.8)
EPI CELLS #/AREA URNS HPF: ABNORMAL /HPF (ref 0–5)
ERYTHROCYTE [DISTWIDTH] IN BLOOD BY AUTOMATED COUNT: 12.5 % (ref 11.9–14.6)
GLOBULIN SER CALC-MCNC: 3.3 G/DL (ref 2.8–4.5)
GLUCOSE SERPL-MCNC: 90 MG/DL (ref 65–100)
GLUCOSE UR STRIP.AUTO-MCNC: NEGATIVE MG/DL
HCT VFR BLD AUTO: 35.6 % (ref 35.8–46.3)
HDLC SERPL-MCNC: 60 MG/DL (ref 40–60)
HDLC SERPL: 3
HGB BLD-MCNC: 11.6 G/DL (ref 11.7–15.4)
HGB UR QL STRIP: NEGATIVE
IMM GRANULOCYTES # BLD AUTO: 0 K/UL (ref 0–0.5)
IMM GRANULOCYTES NFR BLD AUTO: 0 % (ref 0–5)
KETONES UR QL STRIP.AUTO: NEGATIVE MG/DL
LDLC SERPL CALC-MCNC: 106 MG/DL
LEUKOCYTE ESTERASE UR QL STRIP.AUTO: ABNORMAL
LYMPHOCYTES # BLD: 2 K/UL (ref 0.5–4.6)
LYMPHOCYTES NFR BLD: 30 % (ref 13–44)
MCH RBC QN AUTO: 30.1 PG (ref 26.1–32.9)
MCHC RBC AUTO-ENTMCNC: 32.6 G/DL (ref 31.4–35)
MCV RBC AUTO: 92.2 FL (ref 82–102)
MONOCYTES # BLD: 0.5 K/UL (ref 0.1–1.3)
MONOCYTES NFR BLD: 7 % (ref 4–12)
NEUTS SEG # BLD: 3.7 K/UL (ref 1.7–8.2)
NEUTS SEG NFR BLD: 57 % (ref 43–78)
NITRITE UR QL STRIP.AUTO: NEGATIVE
NRBC # BLD: 0 K/UL (ref 0–0.2)
PH UR STRIP: 6 (ref 5–9)
PLATELET # BLD AUTO: 334 K/UL (ref 150–450)
PMV BLD AUTO: 9.8 FL (ref 9.4–12.3)
POTASSIUM SERPL-SCNC: 4 MMOL/L (ref 3.5–5.1)
PROT SERPL-MCNC: 7.1 G/DL (ref 6.3–8.2)
PROT UR STRIP-MCNC: NEGATIVE MG/DL
RBC # BLD AUTO: 3.86 M/UL (ref 4.05–5.2)
RBC #/AREA URNS HPF: ABNORMAL /HPF (ref 0–5)
SODIUM SERPL-SCNC: 138 MMOL/L (ref 133–143)
SP GR UR REFRACTOMETRY: 1.01 (ref 1–1.02)
TRIGL SERPL-MCNC: 80 MG/DL (ref 35–150)
TSH W FREE THYROID IF ABNORMAL: 1.95 UIU/ML (ref 0.36–3.74)
UROBILINOGEN UR QL STRIP.AUTO: 0.2 EU/DL (ref 0.2–1)
VLDLC SERPL CALC-MCNC: 16 MG/DL (ref 6–23)
WBC # BLD AUTO: 6.6 K/UL (ref 4.3–11.1)
WBC URNS QL MICRO: ABNORMAL /HPF (ref 0–4)

## 2023-02-02 RX ORDER — ZOSTER VACCINE RECOMBINANT, ADJUVANTED 50 MCG/0.5
0.5 KIT INTRAMUSCULAR SEE ADMIN INSTRUCTIONS
Qty: 0.5 ML | Refills: 0 | Status: SHIPPED | OUTPATIENT
Start: 2023-02-02 | End: 2023-08-01

## 2023-02-02 SDOH — ECONOMIC STABILITY: HOUSING INSECURITY
IN THE LAST 12 MONTHS, WAS THERE A TIME WHEN YOU DID NOT HAVE A STEADY PLACE TO SLEEP OR SLEPT IN A SHELTER (INCLUDING NOW)?: NO

## 2023-02-02 SDOH — ECONOMIC STABILITY: FOOD INSECURITY: WITHIN THE PAST 12 MONTHS, YOU WORRIED THAT YOUR FOOD WOULD RUN OUT BEFORE YOU GOT MONEY TO BUY MORE.: NEVER TRUE

## 2023-02-02 SDOH — ECONOMIC STABILITY: INCOME INSECURITY: HOW HARD IS IT FOR YOU TO PAY FOR THE VERY BASICS LIKE FOOD, HOUSING, MEDICAL CARE, AND HEATING?: NOT VERY HARD

## 2023-02-02 SDOH — ECONOMIC STABILITY: FOOD INSECURITY: WITHIN THE PAST 12 MONTHS, THE FOOD YOU BOUGHT JUST DIDN'T LAST AND YOU DIDN'T HAVE MONEY TO GET MORE.: NEVER TRUE

## 2023-02-02 ASSESSMENT — PATIENT HEALTH QUESTIONNAIRE - PHQ9
6. FEELING BAD ABOUT YOURSELF - OR THAT YOU ARE A FAILURE OR HAVE LET YOURSELF OR YOUR FAMILY DOWN: 0
9. THOUGHTS THAT YOU WOULD BE BETTER OFF DEAD, OR OF HURTING YOURSELF: 0
3. TROUBLE FALLING OR STAYING ASLEEP: 1
SUM OF ALL RESPONSES TO PHQ QUESTIONS 1-9: 5
1. LITTLE INTEREST OR PLEASURE IN DOING THINGS: 3
10. IF YOU CHECKED OFF ANY PROBLEMS, HOW DIFFICULT HAVE THESE PROBLEMS MADE IT FOR YOU TO DO YOUR WORK, TAKE CARE OF THINGS AT HOME, OR GET ALONG WITH OTHER PEOPLE: 1
SUM OF ALL RESPONSES TO PHQ QUESTIONS 1-9: 5
5. POOR APPETITE OR OVEREATING: 0
8. MOVING OR SPEAKING SO SLOWLY THAT OTHER PEOPLE COULD HAVE NOTICED. OR THE OPPOSITE, BEING SO FIGETY OR RESTLESS THAT YOU HAVE BEEN MOVING AROUND A LOT MORE THAN USUAL: 0
SUM OF ALL RESPONSES TO PHQ QUESTIONS 1-9: 5
SUM OF ALL RESPONSES TO PHQ9 QUESTIONS 1 & 2: 3
2. FEELING DOWN, DEPRESSED OR HOPELESS: 0
4. FEELING TIRED OR HAVING LITTLE ENERGY: 1
SUM OF ALL RESPONSES TO PHQ QUESTIONS 1-9: 5
7. TROUBLE CONCENTRATING ON THINGS, SUCH AS READING THE NEWSPAPER OR WATCHING TELEVISION: 0

## 2023-02-02 ASSESSMENT — ENCOUNTER SYMPTOMS
DIARRHEA: 0
CONSTIPATION: 0
NAUSEA: 0
COUGH: 0
VOMITING: 0
SHORTNESS OF BREATH: 0
WHEEZING: 0
BLOOD IN STOOL: 0

## 2023-02-02 ASSESSMENT — LIFESTYLE VARIABLES
HOW MANY STANDARD DRINKS CONTAINING ALCOHOL DO YOU HAVE ON A TYPICAL DAY: 1 OR 2
HOW OFTEN DO YOU HAVE A DRINK CONTAINING ALCOHOL: MONTHLY OR LESS

## 2023-02-02 NOTE — PROGRESS NOTES
2/2/2023 10:57 AM  Location:[facility] [address] [facility]  [place]  Patient #:  [id number]  YOB: 1940            History of Present Illness     Chief Complaint   Patient presents with    Annual Exam     Patient here for her yearly exam    Knee Pain     Having left knee pain - is seeing the Ortho - had an injection 2 weeks ago. Medicare AWV     Medicare wellness        Ms. Louise Dawkins is a [de-identified] y.o. female  who presents for the above. Patient wants to get her neck fixed and her knee fixed so she can get back to playing tennis. Because she is not playing tennis, she has some struggle with depression. Knee Pain   The incident occurred more than 1 week ago. Pertinent negatives include no numbness. Allergies   Allergen Reactions    Sulfa Antibiotics Nausea Only and Other (See Comments)     dizzy     Past Medical History:   Diagnosis Date    Actinic keratosis 7/24/2015    Arthritis     Benign neoplasm of colon 7/24/2015    Closed fracture of nasal bone 6/21/2017    Last Assessment & Plan:  Formatting of this note might be different from the original. Patient just needs to be careful about hitting her nose over the next week to 10 days. If there are questions or concerns then she will call.     Cough     COVID 09/2021    Diverticulitis of colon (without mention of hemorrhage)(562.11) 7/24/2015    Diverticulosis of colon (without mention of hemorrhage) 7/24/2015    Encounter for long-term (current) use of other medications 7/24/2015    Essential hypertension, benign 7/24/2015    on med for control     Former smoker     GERD (gastroesophageal reflux disease)     prn med     Nausea & vomiting     post-op N/V    Postmenopausal atrophic vaginitis 7/29/2015    PUD (peptic ulcer disease)     hx; no current problems     Pure hypercholesterolemia 07/24/2015    on med    Thyroid nodule      Social History     Socioeconomic History    Marital status:      Spouse name: None Number of children: None    Years of education: None    Highest education level: None   Tobacco Use    Smoking status: Former     Packs/day: 1.00     Years: 40.00     Pack years: 40.00     Types: Cigarettes     Quit date: 2006     Years since quittin.0    Smokeless tobacco: Never   Substance and Sexual Activity    Alcohol use: Yes    Drug use: No   Social History Narrative    Retired . Has lived in [place] and [place]. Has one dog. Social Determinants of Health     Financial Resource Strain: Low Risk     Difficulty of Paying Living Expenses: Not very hard   Food Insecurity: No Food Insecurity    Worried About Running Out of Food in the Last Year: Never true    Ran Out of Food in the Last Year: Never true   Transportation Needs: Unknown    Lack of Transportation (Non-Medical):  No   Physical Activity: Inactive    Days of Exercise per Week: 0 days    Minutes of Exercise per Session: 0 min   Housing Stability: Unknown    Unstable Housing in the Last Year: No     Past Surgical History:   Procedure Laterality Date    APPENDECTOMY      BACK SURGERY      lumbar    CATARACT REMOVAL Bilateral     HYSTERECTOMY (CERVIX STATUS UNKNOWN)      ROTATOR CUFF REPAIR Right 2013     Current Outpatient Medications   Medication Sig Dispense Refill    zoster recombinant adjuvanted vaccine (SHINGRIX) 50 MCG/0.5ML SUSR injection Inject 0.5 mLs into the muscle See Admin Instructions 1 dose now and repeat in 2-6 months 0.5 mL 0    hydroCHLOROthiazide (HYDRODIURIL) 12.5 MG tablet TAKE ONE TABLET BY MOUTH EVERY DAY 90 tablet 3    omeprazole (PRILOSEC OTC) 20 MG tablet Take 1 tablet by mouth daily 90 tablet 3    levothyroxine (SYNTHROID) 88 MCG tablet TAKE ONE TABLET BY MOUTH ON MONDAY, WEDNESDAY AND FRIDAY 30 tablet 11    BIOTIN PO Take 1 capsule by mouth daily      OXYGEN 2 LPM QHS      albuterol sulfate  (90 Base) MCG/ACT inhaler Inhale 1 puff into the lungs every 4 hours as needed      aspirin 81 MG EC tablet Take 81 mg by mouth daily      Calcium Carb-Cholecalciferol 600-400 MG-UNIT CAPS Take 1 tablet by mouth daily      levothyroxine (SYNTHROID) 75 MCG tablet TAKE ONE TABLET BY MOUTH ON Tuesday, Thursday, Saturday, AND SUNDAY      losartan (COZAAR) 50 MG tablet TAKE ONE TABLET BY MOUTH EVERY DAY      lovastatin (MEVACOR) 20 MG tablet TAKE ONE TABLET BY MOUTH EVERY DAY AT NIGHT       No current facility-administered medications for this visit. Health Maintenance   Topic Date Due    Shingles vaccine (1 of 2) Never done    COVID-19 Vaccine (5 - Booster for Pfizer series) 09/05/2022    Lipids  08/01/2023    Depression Screen  08/01/2023    Annual Wellness Visit (AWV)  02/03/2024    Breast cancer screen  04/18/2024    DTaP/Tdap/Td vaccine (2 - Td or Tdap) 12/16/2029    DEXA (modify frequency per FRAX score)  Completed    Flu vaccine  Completed    Pneumococcal 65+ years Vaccine  Completed    Hepatitis A vaccine  Aged Out    Hib vaccine  Aged Out    Meningococcal (ACWY) vaccine  Aged Out     Family History   Problem Relation Age of Onset    Other Brother         has one kidney    Heart Disease Brother     Diabetes Brother     Lupus Sister     Thyroid Cancer Neg Hx     Osteoporosis Sister     Liver Disease Sister     Heart Disease Mother     Cancer Mother         colon    Stroke Father     Diabetes Sister     Hypertension Sister     Thyroid Disease Neg Hx              Review of Systems  Review of Systems   Constitutional:  Negative for chills and fever. Respiratory:  Negative for cough, shortness of breath and wheezing. Cardiovascular:  Negative for chest pain, palpitations and leg swelling. Gastrointestinal:  Negative for blood in stool, constipation, diarrhea, nausea and vomiting. Gastroesophageal reflux disease is controlled   Genitourinary:  Negative for difficulty urinating, dysuria and hematuria. Musculoskeletal:  Positive for arthralgias and neck pain.    Neurological:  Negative for weakness and numbness. Psychiatric/Behavioral:  Positive for dysphoric mood (feels a little blue related to not playing tennis; is hoping to get back to this once her neck and knee are repaired). The patient is not nervous/anxious. /68 (Site: Left Upper Arm, Position: Sitting, Cuff Size: Medium Adult)   Pulse 86   Resp 18   Ht 5' 3\" (1.6 m)   Wt 155 lb (70.3 kg)   SpO2 98%   BMI 27.46 kg/m²       Physical Exam    Physical Exam  Constitutional:       Appearance: Normal appearance. She is not ill-appearing. HENT:      Head: Normocephalic. Right Ear: Tympanic membrane normal.      Left Ear: Tympanic membrane normal.   Eyes:      Conjunctiva/sclera: Conjunctivae normal.      Pupils: Pupils are equal, round, and reactive to light. Neck:      Vascular: No carotid bruit. Cardiovascular:      Rate and Rhythm: Normal rate and regular rhythm. Pulmonary:      Effort: Pulmonary effort is normal.      Breath sounds: Normal breath sounds. No wheezing. Chest:   Breasts:     Right: Inverted nipple present. No mass. Left: No inverted nipple or mass. Abdominal:      General: Abdomen is flat. Palpations: Abdomen is soft. Tenderness: There is no abdominal tenderness. There is no right CVA tenderness or left CVA tenderness. Musculoskeletal:      Right hand: Deformity present. Left hand: Deformity present. Cervical back: No tenderness. Thoracic back: No tenderness. Lumbar back: No tenderness. Left knee: Swelling present. Right lower leg: No edema. Left lower leg: No edema. Comments: Arthritic change in hands noted   Lymphadenopathy:      Upper Body:      Right upper body: No supraclavicular or axillary adenopathy. Left upper body: No supraclavicular or axillary adenopathy. Skin:     Comments: actinic keratoses on arms and chest   Neurological:      Mental Status: She is alert and oriented to person, place, and time. Motor: No weakness.       Gait: Gait normal.      Deep Tendon Reflexes:      Reflex Scores:       Patellar reflexes are 2+ on the right side and 2+ on the left side. Psychiatric:         Mood and Affect: Mood normal.         Behavior: Behavior normal.         Assessment & Plan    Current Outpatient Medications   Medication Sig Dispense Refill    zoster recombinant adjuvanted vaccine (SHINGRIX) 50 MCG/0.5ML SUSR injection Inject 0.5 mLs into the muscle See Admin Instructions 1 dose now and repeat in 2-6 months 0.5 mL 0    hydroCHLOROthiazide (HYDRODIURIL) 12.5 MG tablet TAKE ONE TABLET BY MOUTH EVERY DAY 90 tablet 3    omeprazole (PRILOSEC OTC) 20 MG tablet Take 1 tablet by mouth daily 90 tablet 3    levothyroxine (SYNTHROID) 88 MCG tablet TAKE ONE TABLET BY MOUTH ON MONDAY, WEDNESDAY AND FRIDAY 30 tablet 11    BIOTIN PO Take 1 capsule by mouth daily      OXYGEN 2 LPM QHS      albuterol sulfate  (90 Base) MCG/ACT inhaler Inhale 1 puff into the lungs every 4 hours as needed      aspirin 81 MG EC tablet Take 81 mg by mouth daily      Calcium Carb-Cholecalciferol 600-400 MG-UNIT CAPS Take 1 tablet by mouth daily      levothyroxine (SYNTHROID) 75 MCG tablet TAKE ONE TABLET BY MOUTH ON Tuesday, Thursday, Saturday, AND SUNDAY      losartan (COZAAR) 50 MG tablet TAKE ONE TABLET BY MOUTH EVERY DAY      lovastatin (MEVACOR) 20 MG tablet TAKE ONE TABLET BY MOUTH EVERY DAY AT NIGHT       No current facility-administered medications for this visit.        Orders Placed This Encounter   Procedures    [facility] DIGITAL SCREEN BILATERAL     Standing Status:   Future     Standing Expiration Date:   4/2/2024     Scheduling Instructions:      Last one done 04/19/2022 - [facility]     Order Specific Question:   Reason for exam:     Answer:   screening    Urinalysis     Standing Status:   Future     Number of Occurrences:   1     Standing Expiration Date:   2/2/2024    Comprehensive Metabolic Panel     Standing Status:   Future     Number of Occurrences:   1 Standing Expiration Date:   2/2/2024    TSH with Reflex     Standing Status:   Future     Number of Occurrences:   1     Standing Expiration Date:   2/2/2024    Lipid Panel     Standing Status:   Future     Number of Occurrences:   1     Standing Expiration Date:   2/2/2024    CBC with Auto Differential     Standing Status:   Future     Number of Occurrences:   1     Standing Expiration Date:   2/2/2024       Orders Placed This Encounter   Medications    zoster recombinant adjuvanted vaccine [facility]) 50 MCG/0.5ML SUSR injection     Sig: Inject 0.5 mLs into the muscle See Admin Instructions 1 dose now and repeat in 2-6 months     Dispense:  0.5 mL     Refill:  0       Medications Discontinued During This Encounter   Medication Reason    fluticasone (FLONASE) 50 MCG/ACT nasal spray LIST CLEANUP    esomeprazole (NEXIUM) 20 MG delayed release capsule LIST CLEANUP    Fluticasone-Salmeterol 113-14 MCG/ACT AEPB LIST CLEANUP        Diagnosis Orders   1. Pure hypercholesterolemia        2. Screening mammogram, encounter for  [facility] DIGITAL SCREEN BILATERAL      3. Medicare annual wellness visit, subsequent        4. Chronic obstructive pulmonary disease, unspecified COPD type ([facility] [address]  CBC with Auto Differential    CBC with Auto Differential      5. Primary hypertension  Urinalysis    Comprehensive Metabolic Panel    TSH with Reflex    Lipid Panel    Lipid Panel    TSH with Reflex    Comprehensive Metabolic Panel    Urinalysis      6. Granulomatous disease, chronic ([address]        7. Postsurgical hypothyroidism        8. Hallux valgus (acquired), left foot        9. Arthritis of left knee        10. Cervical arthritis           Wellness information reviewed and appropriate screening addressed. Advised patient to secure living will and healthcare POA. Will discuss labs over the phone. Urged her to try riding a stationary bike.   Recommended 30 minutes of aerobic exercise at least 4-5 days weekly for physical as well as emotional reasons. Will look into this. Vitals look good. Is doing fairly well physically and emotionally. Follow up as documented or earlier as needed. Return in 6 months (on 8/2/2023) for Medicare Annual Wellness Visit in 1 year. Amanda Rodríguez Annual Wellness Visit    Sammie Oconnell is here for Annual Exam (Patient here for her yearly exam), Knee Pain (Having left knee pain - is seeing the Ortho - had an injection 2 weeks ago. ), and Medicare AWV (Medicare wellness )    Assessment & Plan   Pure hypercholesterolemia  Screening mammogram, encounter for  -     [facility] DIGITAL SCREEN BILATERAL; Future  Medicare annual wellness visit, subsequent  Chronic obstructive pulmonary disease, unspecified COPD type ([facility] [address]  -     CBC with Auto Differential; Future  Primary hypertension  -     Urinalysis; Future  -     Comprehensive Metabolic Panel; Future  -     TSH with Reflex; Future  -     Lipid Panel; Future  Granulomatous disease, chronic (HCC)  Postsurgical hypothyroidism  Hallux valgus (acquired), left foot  Arthritis of left knee  Cervical arthritis      Recommendations for Preventive Services Due: see orders and patient instructions/AVS.  Recommended screening schedule for the next 5-10 years is provided to the patient in written form: see Patient Instructions/AVS.     Return in 6 months (on 8/2/2023) for Medicare Annual Wellness Visit in 1 year. Subjective       Patient's complete Health Risk Assessment and screening values have been reviewed and are found in Flowsheets. The following problems were reviewed today and where indicated follow up appointments were made and/or referrals ordered.     Positive Risk Factor Screenings with Interventions:        Depression:  PHQ-2 Score: 3  PHQ-9 Total Score: 5    Interpretation:   1-4 = minimal  5-9 = mild  10-14 = moderate  15-19 = moderately severe  20-27 = severe  Interventions:  Recommended 30 minutes of aerobic exercise at least 4-5 days weekly for physical as well as emotional reasons. Weight and Activity:  Physical Activity: Inactive    Days of Exercise per Week: 0 days    Minutes of Exercise per Session: 0 min     On average, how many days per week do you engage in moderate to strenuous exercise (like a brisk walk)?: 0 days  Have you lost any weight without trying in the past 3 months?: No  Body mass index: (!) 27.45      Inactivity Interventions:  Urged her to try a stationary bike until able to get her joints repaired         Safety:  Do you have any tripping hazards - loose or unsecured carpets or rugs?: (!) Yes  Do you have either shower bars, grab bars, non-slip mats or non-slip surfaces in your shower or bathtub?: (!) No  Interventions:  NA     Advanced Directives:  Do you have a Living Will?: (!) No    Intervention:                         Objective   Vitals:    02/02/23 0958   BP: 129/68   Site: Left Upper Arm   Position: Sitting   Cuff Size: Medium Adult   Pulse: 86   Resp: 18   SpO2: 98%   Weight: 155 lb (70.3 kg)   Height: 5' 3\" (1.6 m)      Body mass index is 27.46 kg/m². Allergies   Allergen Reactions    Sulfa Antibiotics Nausea Only and Other (See Comments)     dizzy     Prior to Visit Medications    Medication Sig Taking?  Authorizing Provider   zoster recombinant adjuvanted vaccine [facility]) 50 MCG/0.5ML SUSR injection Inject 0.5 mLs into the muscle See Admin Instructions 1 dose now and repeat in 2-6 months Yes Serjio Almonte MD   hydroCHLOROthiazide (HYDRODIURIL) 12.5 MG tablet TAKE ONE TABLET BY MOUTH EVERY DAY Yes Serjio Almonte MD   omeprazole (PRILOSEC OTC) 20 MG tablet Take 1 tablet by mouth daily Yes Serjio Almonte MD   levothyroxine (SYNTHROID) 88 MCG tablet TAKE ONE TABLET BY MOUTH ON MONDAY, [address] [facility] Yes Serjio Almonte MD   BIOTIN PO Take 1 capsule by mouth daily Yes Ar Automatic Reconciliation   OXYGEN 2 LPM QHS Yes Ar Automatic Reconciliation   albuterol sulfate  (90 Base) MCG/ACT inhaler Inhale 1 puff into the lungs every 4 hours as needed Yes Ar Automatic Reconciliation   aspirin 81 MG EC tablet Take 81 mg by mouth daily Yes Ar Automatic Reconciliation   Calcium Carb-Cholecalciferol 600-400 MG-UNIT CAPS Take 1 tablet by mouth daily Yes Ar Automatic Reconciliation   levothyroxine (SYNTHROID) 75 MCG tablet TAKE ONE TABLET BY MOUTH ON Tuesday, Thursday, Saturday, AND SUNDAY Yes Ar Automatic Reconciliation   losartan (COZAAR) 50 MG tablet TAKE ONE TABLET BY MOUTH EVERY DAY Yes Ar Automatic Reconciliation   lovastatin (MEVACOR) 20 MG tablet TAKE ONE TABLET BY MOUTH EVERY DAY AT NIGHT Yes Ar Automatic Reconciliation       CareTeam (Including outside providers/suppliers regularly involved in providing care):   Patient Care Team:  Vignesh Campo MD as PCP - Miranda Finney MD as PCP - Empaneled Provider     Reviewed and updated this visit:  Tobacco  Allergies  Meds  Problems  Med Hx  Surg Hx  Soc Hx  Fam Hx               Vignesh Campo MD

## 2023-02-02 NOTE — PATIENT INSTRUCTIONS
Advance Directives: Care Instructions  Overview  An advance directive is a legal way to state your wishes at the end of your life. It tells your family and your doctor what to do if you can't say what you want. There are two main types of advance directives. You can change them any time your wishes change. Living will. This form tells your family and your doctor your wishes about life support and other treatment. The form is also called a declaration. Medical power of . This form lets you name a person to make treatment decisions for you when you can't speak for yourself. This person is called a health care agent (health care proxy, health care surrogate). The form is also called a durable power of  for health care. If you do not have an advance directive, decisions about your medical care may be made by a family member, or by a doctor or a  who doesn't know you. It may help to think of an advance directive as a gift to the people who care for you. If you have one, they won't have to make tough decisions by themselves. For more information, including forms for your state, see the [address] website (www.caringinfo.org/planning/advance-directives/). Follow-up care is a key part of your treatment and safety. Be sure to make and go to all appointments, and call your doctor if you are having problems. It's also a good idea to know your test results and keep a list of the medicines you take. What should you include in an advance directive? Many states have a unique advance directive form. (It may ask you to address specific issues.) Or you might use a universal form that's approved by many states. If your form doesn't tell you what to address, it may be hard to know what to include in your advance directive. Use the questions below to help you get started. Who do you want to make decisions about your medical care if you are not able to?   What life-support measures do you want if you have a serious illness that gets worse over time or can't be cured? What are you most afraid of that might happen? (Maybe you're afraid of having pain, losing your independence, or being kept alive by machines.)  Where would you prefer to die? (Your home? A hospital? A nursing home?)  Do you want to donate your organs when you die? Do you want certain [employer] practices performed before you die? When should you call for help? Be sure to contact your doctor if you have any questions. Where can you learn more? Go to http://www.layne.com/ and enter R264 to learn more about \"Advance Directives: Care Instructions. \"  Current as of: June 16, 2022               Content Version: 13.5  © 2006-2022 [employer]. Care instructions adapted under license by [facility] ([facility]). If you have questions about a medical condition or this instruction, always ask your healthcare professional. [place] [address] any warranty or liability for your use of this information. A Healthy Heart: Care Instructions  Your Care Instructions     Coronary artery disease, also called heart disease, occurs when a substance called plaque builds up in the vessels that supply oxygen-rich blood to your heart muscle. This can narrow the blood vessels and reduce blood flow. A heart attack happens when blood flow is completely blocked. A high-fat diet, smoking, and other factors increase the risk of heart disease. Your doctor has found that you have a chance of having heart disease. You can do lots of things to keep your heart healthy. It may not be easy, but you can change your diet, exercise more, and quit smoking. These steps really work to lower your chance of heart disease. Follow-up care is a key part of your treatment and safety. Be sure to make and go to all appointments, and call your doctor if you are having problems.  It's also a good idea to know your test results and keep a list of the medicines you take. How can you care for yourself at home? Diet    Use less salt when you cook and eat. This helps lower your blood pressure. Taste food before salting. Add only a little salt when you think you need it. With time, your taste buds will adjust to less salt.     Eat fewer snack items, fast foods, canned soups, and other high-salt, high-fat, processed foods.     Read food labels and try to avoid saturated and trans fats. They increase your risk of heart disease by raising cholesterol levels.     Limit the amount of solid fat-butter, margarine, and shortening-you eat. Use olive, peanut, or canola oil when you cook. Bake, broil, and steam foods instead of frying them.     Eat a variety of fruit and vegetables every day. Dark green, deep orange, red, or yellow fruits and vegetables are especially good for you. Examples include spinach, carrots, peaches, and berries.     Foods high in fiber can reduce your cholesterol and provide important vitamins and minerals. High-fiber foods include whole-grain cereals and breads, oatmeal, beans, brown rice, citrus fruits, and apples.     Eat lean proteins. Heart-healthy proteins include seafood, lean meats and poultry, eggs, beans, peas, nuts, seeds, and soy products.     Limit drinks and foods with added sugar. These include candy, desserts, and soda pop. Lifestyle changes    If your doctor recommends it, get more exercise. Walking is a good choice. Bit by bit, increase the amount you walk every day. Try for at least 30 minutes on most days of the week. You also may want to swim, bike, or do other activities.     Do not smoke. If you need help quitting, talk to your doctor about stop-smoking programs and medicines. These can increase your chances of quitting for good. Quitting smoking may be the most important step you can take to protect your heart. It is never too late to quit.     Limit alcohol to 2 drinks a day for men and 1 drink a day for women.  Too much alcohol can cause health problems.     Manage other health problems such as diabetes, high blood pressure, and high cholesterol. If you think you may have a problem with alcohol or drug use, talk to your doctor. Medicines    Take your medicines exactly as prescribed. Call your doctor if you think you are having a problem with your medicine.     If your doctor recommends aspirin, take the amount directed each day. Make sure you take aspirin and not another kind of pain reliever, such as acetaminophen (Tylenol). When should you call for help? Call 911 if you have symptoms of a heart attack. These may include:    Chest pain or pressure, or a strange feeling in the chest.     Sweating.     Shortness of breath.     Pain, pressure, or a strange feeling in the back, neck, jaw, or upper belly or in one or both shoulders or arms.     Lightheadedness or sudden weakness.     A fast or irregular heartbeat. After you call 911, the  may tell you to chew 1 adult-strength or 2 to 4 low-dose aspirin. Wait for an ambulance. Do not try to drive yourself. Watch closely for changes in your health, and be sure to contact your doctor if you have any problems. Where can you learn more? Go to http://www.layne.com/ and enter F075 to learn more about \"A Healthy Heart: Care Instructions. \"  Current as of: September 7, 2022               Content Version: 13.5  © 9336-9275 Healthwise, Incorporated. Care instructions adapted under license by [facility] ([facility]). If you have questions about a medical condition or this instruction, always ask your healthcare professional. [place] [address] any warranty or liability for your use of this information. Personalized Preventive Plan for Annemarie Laird - 2/2/2023  Medicare offers a range of preventive health benefits. Some of the tests and screenings are paid in full while other may be subject to a deductible, co-insurance, and/or copay.     Some of these benefits include a comprehensive review of your medical history including lifestyle, illnesses that may run in your family, and various assessments and screenings as appropriate.    After reviewing your medical record and screening and assessments performed today your provider may have ordered immunizations, labs, imaging, and/or referrals for you.  A list of these orders (if applicable) as well as your Preventive Care list are included within your After Visit Summary for your review.    Other Preventive Recommendations:    A preventive eye exam performed by an eye specialist is recommended every 1-2 years to screen for glaucoma; cataracts, macular degeneration, and other eye disorders.  A preventive dental visit is recommended every 6 months.  Try to get at least 150 minutes of exercise per week or 10,000 steps per day on a pedometer .  Order or download the FREE \"Exercise & Physical Activity: Your Everyday Guide\" from [employer]. Call 1-496.152.9192 or search [employer] [place] [employer] online.  You need 8103-3751 mg of calcium and 9647-0695 IU of vitamin D per day. It is possible to meet your calcium requirement with diet alone, but a vitamin D supplement is usually necessary to meet this goal.  When exposed to the sun, use a sunscreen that protects against both UVA and UVB radiation with an SPF of 30 or greater. Reapply every 2 to 3 hours or after sweating, drying off with a towel, or swimming.  Always wear a seat belt when traveling in a car. Always wear a helmet when riding a bicycle or motorcycle.       Learning About Mindfulness for Stress  What are mindfulness and stress?     Stress is what you feel when you have to handle more than you are used to. A lot of things can cause stress. You may feel stress when you go on a job interview, take a test, or run a race. This kind of short-term stress is normal and even useful. It can help you if you need to work hard or react  quickly. Stress also can last a long time. Long-term stress is caused by stressful situations or events. Examples of long-term stress include long-term health problems, ongoing problems at work, and conflicts in your family. Long-term stress can harm your health. Mindfulness is a focus only on things happening in the present moment. It's a process of purposefully paying attention to and being aware of your surroundings, your emotions, your thoughts, and how your body feels. You are aware of these things, but you aren't judging these experiences as \"good\" or \"bad. \" Mindfulness can help you learn to calm your mind and body to help you cope with illness, pain, and stress. How does mindfulness help to relieve stress? Mindfulness can help quiet your mind and relax your body. Studies show that it can help some people sleep better, feel less anxious, and bring their blood pressure down. And it's been shown to help some people live and cope better with certain health problems like heart disease, depression, chronic pain, and cancer. How do you practice mindfulness? To be mindful is to pay attention, to be present, and to be accepting. When you're mindful, you do just one thing and you pay close attention to that one thing. For example, you may sit quietly and notice your emotions or how your food tastes and smells. When you're present, you focus on the things that are happening right now. You let go of your thoughts about the past and the future. When you dwell on the past or the future, you miss moments that can heal and strengthen you. You may miss moments like hearing a child laugh or seeing a friendly face when you think you're all alone. When you're accepting, you don't  the present moment. Instead you accept your thoughts and feelings as they come. You can practice anytime, anywhere, and in any way you choose. You can practice in many ways.  Here are a few ideas:  While doing your chores, like washing the dishes, let your mind focus on what's in your hand. What does the dish feel like? Is the water warm or cold? Go outside and take a few deep breaths. What is the air like? Is it warm or cold? When you can, take some time at the start of your day to sit alone and think. Take a slow walk by yourself. Count your steps while you breathe in and out. Try yoga breathing exercises, stretches, and poses to strengthen and relax your muscles. At work, if you can, try to stop for a few moments each hour. Note how your body feels. Let yourself regroup and let your mind settle before you return to what you were doing. If you struggle with anxiety or \"worry thoughts,\" imagine your mind as a blue bogdan and your worry thoughts as clouds. Now imagine those worry thoughts floating across your mind's bogdan. Just let them pass by as you watch. Follow-up care is a key part of your treatment and safety. Be sure to make and go to all appointments, and call your doctor if you are having problems. It's also a good idea to know your test results and keep a list of the medicines you take. Where can you learn more? Go to http://www.layne.com/ and enter M676 to learn more about \"Learning About Mindfulness for Stress. \"  Current as of: February 9, 2022               Content Version: 13.5  © 1769-4703 Healthwise, Incorporated. Care instructions adapted under license by [facility] ([facility]). If you have questions about a medical condition or this instruction, always ask your healthcare professional. [address] any warranty or liability for your use of this information. Learning About Being Active as an Older Adult  Why is being active important as you get older? Being active is one of the best things you can do for your health. And it's never too late to start. Being active--or getting active, if you aren't already--has definite benefits.  It can:  Give you more energy,  Keep your mind sharp. Improve balance to reduce your risk of falls. Help you manage chronic illness with fewer medicines. No matter how old you are, how fit you are, or what health problems you have, there is a form of activity that will work for you. And the more physical activity you can do, the better your overall health will be. What kinds of activity can help you stay healthy? Being more active will make your daily activities easier. Physical activity includes planned exercise and things you do in daily life. There are four types of activity:  Aerobic. Doing aerobic activity makes your heart and lungs strong. Includes walking, dancing, and gardening. Aim for at least 2½ hours spread throughout the week. It improves your energy and can help you sleep better. Muscle-strengthening. This type of activity can help maintain muscle and strengthen bones. Includes climbing stairs, using resistance bands, and lifting or carrying heavy loads. Aim for at least twice a week. It can help protect the knees and other joints. Stretching. Stretching gives you better range of motion in joints and muscles. Includes upper arm stretches, calf stretches, and gentle yoga. Aim for at least twice a week, preferably after your muscles are warmed up from other activities. It can help you function better in daily life. Balancing. This helps you stay coordinated and have good posture. Includes heel-to-toe walking, jose chi, and certain types of yoga. Aim for at least 3 days a week. It can reduce your risk of falling. Even if you have a hard time meeting the recommendations, it's better to be more active than less active. All activity done in each category counts toward your weekly total. You'd be surprised how daily things like carrying groceries, keeping up with grandchildren, and taking the stairs can add up. What keeps you from being active? If you've had a hard time being more active, you're not alone.  Maybe you remember being able to do more. Or maybe you've never thought of yourself as being active. It's frustrating when you can't do the things you want. Being more active can help. What's holding you back? Getting started. Have a goal, but break it into easy tasks. Small steps build into big accomplishments. Staying motivated. If you feel like skipping your activity, remember your goal. Maybe you want to move better and stay independent. Every activity gets you one step closer. Not feeling your best.  Start with 5 minutes of an activity you enjoy. Prove to yourself you can do it. As you get comfortable, increase your time. You may not be where you want to be. But you're in the process of getting there. Everyone starts somewhere. How can you find safe ways to stay active? Talk with your doctor about any physical challenges you're facing. Make a plan with your doctor if you have a health problem or aren't sure how to get started with activity. If you're already active, ask your doctor if there is anything you should change to stay safe as your body and health change. If you tend to feel dizzy after you take medicine, avoid activity at that time. Try being active before you take your medicine. This will reduce your risk of falls. If you plan to be active at home, make sure to clear your space before you get started. Remove things like TV cords, coffee tables, and throw rugs. It's safest to have plenty of space to move freely. The key to getting more active is to take it slow and steady. Try to improve only a little bit at a time. Pick just one area to improve on at first. And if an activity hurts, stop and talk to your doctor. Where can you learn more? Go to http://www.layne.com/ and enter P600 to learn more about \"Learning About Being Active as an Older Adult. \"  Current as of: October 10, 2022               Content Version: 13.5  © 8724-0647 Healthwise, Incorporated.    Care instructions adapted under license by [facility] ([facility]). If you have questions about a medical condition or this instruction, always ask your healthcare professional. [address] any warranty or liability for your use of this information. Advance Directives: Care Instructions  Overview  An advance directive is a legal way to state your wishes at the end of your life. It tells your family and your doctor what to do if you can't say what you want. There are two main types of advance directives. You can change them any time your wishes change. Living will. This form tells your family and your doctor your wishes about life support and other treatment. The form is also called a declaration. Medical power of . This form lets you name a person to make treatment decisions for you when you can't speak for yourself. This person is called a health care agent (health care proxy, health care surrogate). The form is also called a durable power of  for health care. If you do not have an advance directive, decisions about your medical care may be made by a family member, or by a doctor or a  who doesn't know you. It may help to think of an advance directive as a gift to the people who care for you. If you have one, they won't have to make tough decisions by themselves. For more information, including forms for your state, see the [address] website (www.caringinfo.org/planning/advance-directives/). Follow-up care is a key part of your treatment and safety. Be sure to make and go to all appointments, and call your doctor if you are having problems. It's also a good idea to know your test results and keep a list of the medicines you take. What should you include in an advance directive? Many states have a unique advance directive form. (It may ask you to address specific issues.) Or you might use a universal form that's approved by many states.   If your form doesn't tell you what to address, it may be hard to know what to include in your advance directive. Use the questions below to help you get started. Who do you want to make decisions about your medical care if you are not able to? What life-support measures do you want if you have a serious illness that gets worse over time or can't be cured? What are you most afraid of that might happen? (Maybe you're afraid of having pain, losing your independence, or being kept alive by machines.)  Where would you prefer to die? (Your home? A hospital? A nursing home?)  Do you want to donate your organs when you die? Do you want certain [employer] practices performed before you die? When should you call for help? Be sure to contact your doctor if you have any questions. Where can you learn more? Go to http://www.layne.com/ and enter R264 to learn more about \"Advance Directives: Care Instructions. \"  Current as of: June 16, 2022               Content Version: 13.5  © 2006-2022 [employer]. Care instructions adapted under license by [facility] ([facility]). If you have questions about a medical condition or this instruction, always ask your healthcare professional. [place] [address] any warranty or liability for your use of this information. A Healthy Heart: Care Instructions  Your Care Instructions     Coronary artery disease, also called heart disease, occurs when a substance called plaque builds up in the vessels that supply oxygen-rich blood to your heart muscle. This can narrow the blood vessels and reduce blood flow. A heart attack happens when blood flow is completely blocked. A high-fat diet, smoking, and other factors increase the risk of heart disease. Your doctor has found that you have a chance of having heart disease. You can do lots of things to keep your heart healthy. It may not be easy, but you can change your diet, exercise more, and quit smoking.  These steps really work to lower your chance of heart disease.  Follow-up care is a key part of your treatment and safety. Be sure to make and go to all appointments, and call your doctor if you are having problems. It's also a good idea to know your test results and keep a list of the medicines you take.  How can you care for yourself at home?  Diet    Use less salt when you cook and eat. This helps lower your blood pressure. Taste food before salting. Add only a little salt when you think you need it. With time, your taste buds will adjust to less salt.     Eat fewer snack items, fast foods, canned soups, and other high-salt, high-fat, processed foods.     Read food labels and try to avoid saturated and trans fats. They increase your risk of heart disease by raising cholesterol levels.     Limit the amount of solid fat-butter, margarine, and shortening-you eat. Use olive, peanut, or canola oil when you cook. Bake, broil, and steam foods instead of frying them.     Eat a variety of fruit and vegetables every day. Dark green, deep orange, red, or yellow fruits and vegetables are especially good for you. Examples include spinach, carrots, peaches, and berries.     Foods high in fiber can reduce your cholesterol and provide important vitamins and minerals. High-fiber foods include whole-grain cereals and breads, oatmeal, beans, brown rice, citrus fruits, and apples.     Eat lean proteins. Heart-healthy proteins include seafood, lean meats and poultry, eggs, beans, peas, nuts, seeds, and soy products.     Limit drinks and foods with added sugar. These include candy, desserts, and soda pop.   Lifestyle changes    If your doctor recommends it, get more exercise. Walking is a good choice. Bit by bit, increase the amount you walk every day. Try for at least 30 minutes on most days of the week. You also may want to swim, bike, or do other activities.     Do not smoke. If you need help quitting, talk to your doctor about stop-smoking programs and medicines. These can increase  your chances of quitting for good. Quitting smoking may be the most important step you can take to protect your heart. It is never too late to quit.     Limit alcohol to 2 drinks a day for men and 1 drink a day for women. Too much alcohol can cause health problems.     Manage other health problems such as diabetes, high blood pressure, and high cholesterol. If you think you may have a problem with alcohol or drug use, talk to your doctor. Medicines    Take your medicines exactly as prescribed. Call your doctor if you think you are having a problem with your medicine.     If your doctor recommends aspirin, take the amount directed each day. Make sure you take aspirin and not another kind of pain reliever, such as acetaminophen (Tylenol). When should you call for help? Call 911 if you have symptoms of a heart attack. These may include:    Chest pain or pressure, or a strange feeling in the chest.     Sweating.     Shortness of breath.     Pain, pressure, or a strange feeling in the back, neck, jaw, or upper belly or in one or both shoulders or arms.     Lightheadedness or sudden weakness.     A fast or irregular heartbeat. After you call 911, the  may tell you to chew 1 adult-strength or 2 to 4 low-dose aspirin. Wait for an ambulance. Do not try to drive yourself. Watch closely for changes in your health, and be sure to contact your doctor if you have any problems. Where can you learn more? Go to http://www.layne.com/ and enter F075 to learn more about \"A Healthy Heart: Care Instructions. \"  Current as of: September 7, 2022               Content Version: 13.5  © 2006-2022 Healthwise, Incorporated. Care instructions adapted under license by [facility] ([facility]). If you have questions about a medical condition or this instruction, always ask your healthcare professional. [address] any warranty or liability for your use of this information.       Personalized Preventive Plan for Teetee Campbell - 2/2/2023  Medicare offers a range of preventive health benefits. Some of the tests and screenings are paid in full while other may be subject to a deductible, co-insurance, and/or copay. Some of these benefits include a comprehensive review of your medical history including lifestyle, illnesses that may run in your family, and various assessments and screenings as appropriate. After reviewing your medical record and screening and assessments performed today your provider may have ordered immunizations, labs, imaging, and/or referrals for you. A list of these orders (if applicable) as well as your Preventive Care list are included within your After Visit Summary for your review. Other Preventive Recommendations:    A preventive eye exam performed by an eye specialist is recommended every 1-2 years to screen for glaucoma; cataracts, macular degeneration, and other eye disorders. A preventive dental visit is recommended every 6 months. Try to get at least 150 minutes of exercise per week or 10,000 steps per day on a pedometer . Order or download the FREE \"Exercise & Physical Activity: Your Everyday Guide\" from [employer]. Call 9-298.258.3326 or search [employer] online. You need 0521-7646 mg of calcium and 8685-1601 IU of vitamin D per day. It is possible to meet your calcium requirement with diet alone, but a vitamin D supplement is usually necessary to meet this goal.  When exposed to the sun, use a sunscreen that protects against both UVA and UVB radiation with an SPF of 30 or greater. Reapply every 2 to 3 hours or after sweating, drying off with a towel, or swimming. Always wear a seat belt when traveling in a car. Always wear a helmet when riding a bicycle or motorcycle.

## 2023-02-03 NOTE — RESULT ENCOUNTER NOTE
Your labs are stable except that your kidney function is a little off. Please make sure that you drink plenty of water. Reduce use of NSAIDs. Not a cause for concern. Just precautionary. Hope you are feeling well. Thanks.   Amber Carter

## 2023-02-06 RX ORDER — LOVASTATIN 20 MG/1
TABLET ORAL
Qty: 60 TABLET | Refills: 11 | Status: SHIPPED | OUTPATIENT
Start: 2023-02-06

## 2023-03-01 RX ORDER — LOSARTAN POTASSIUM 50 MG/1
TABLET ORAL
Qty: 90 TABLET | Refills: 3 | Status: SHIPPED | OUTPATIENT
Start: 2023-03-01

## 2023-03-15 ENCOUNTER — TELEPHONE (OUTPATIENT)
Dept: PULMONOLOGY | Age: 83
End: 2023-03-15

## 2023-03-15 DIAGNOSIS — J44.9 CHRONIC OBSTRUCTIVE PULMONARY DISEASE, UNSPECIFIED COPD TYPE (HCC): Primary | ICD-10-CM

## 2023-03-15 NOTE — TELEPHONE ENCOUNTER
Simon Gutierrez from [employer] called [phone]. Asking if order has been received in [employer] for oxygen recertification. Is also faxing form to [employer].

## 2023-03-16 NOTE — TELEPHONE ENCOUNTER
I called and spoke with patient about the message I got Dominique Michelle from [facility] called [phone]. Asking if order has been received in [facility] for oxygen recertification. Patient states she is not using the oxygen  at night it keeps her awake and hurts her nose she aske to do another test so see if she can come off of it so we can d/c order I placed order for patient she see Nataliya Silveira on 04011/2023. ... felicity ball

## 2023-03-29 RX ORDER — LEVOTHYROXINE SODIUM 0.07 MG/1
TABLET ORAL
Qty: 30 TABLET | Refills: 11 | Status: SHIPPED | OUTPATIENT
Start: 2023-03-29 | End: 2023-03-30 | Stop reason: SDUPTHER

## 2023-03-30 RX ORDER — LEVOTHYROXINE SODIUM 0.07 MG/1
TABLET ORAL
Qty: 30 TABLET | Refills: 11 | Status: SHIPPED | OUTPATIENT
Start: 2023-03-30

## 2023-03-30 NOTE — TELEPHONE ENCOUNTER
Patient called and wants the refill for her levothyroxine 75 mcg to be resent to the [facility] . They state that they did not get it. Thank you.

## 2023-08-07 ENCOUNTER — OFFICE VISIT (OUTPATIENT)
Dept: INTERNAL MEDICINE CLINIC | Facility: CLINIC | Age: 83
End: 2023-08-07
Payer: MEDICARE

## 2023-08-07 VITALS
RESPIRATION RATE: 16 BRPM | HEIGHT: 65 IN | BODY MASS INDEX: 25.83 KG/M2 | DIASTOLIC BLOOD PRESSURE: 73 MMHG | SYSTOLIC BLOOD PRESSURE: 136 MMHG | WEIGHT: 155 LBS | HEART RATE: 71 BPM

## 2023-08-07 DIAGNOSIS — E78.00 HYPERCHOLESTEROLEMIA: ICD-10-CM

## 2023-08-07 DIAGNOSIS — R25.2 LEG CRAMPS: ICD-10-CM

## 2023-08-07 DIAGNOSIS — E89.0 POSTSURGICAL HYPOTHYROIDISM: ICD-10-CM

## 2023-08-07 DIAGNOSIS — J44.9 CHRONIC OBSTRUCTIVE PULMONARY DISEASE, UNSPECIFIED COPD TYPE (HCC): ICD-10-CM

## 2023-08-07 DIAGNOSIS — J44.9 CHRONIC OBSTRUCTIVE PULMONARY DISEASE, UNSPECIFIED COPD TYPE (HCC): Primary | ICD-10-CM

## 2023-08-07 DIAGNOSIS — D71 GRANULOMATOUS DISEASE, CHRONIC (HCC): ICD-10-CM

## 2023-08-07 LAB
BASOPHILS # BLD: 0.1 K/UL (ref 0–0.2)
BASOPHILS NFR BLD: 1 % (ref 0–2)
DIFFERENTIAL METHOD BLD: ABNORMAL
EOSINOPHIL # BLD: 0.2 K/UL (ref 0–0.8)
EOSINOPHIL NFR BLD: 3 % (ref 0.5–7.8)
ERYTHROCYTE [DISTWIDTH] IN BLOOD BY AUTOMATED COUNT: 12.7 % (ref 11.9–14.6)
HCT VFR BLD AUTO: 35.6 % (ref 35.8–46.3)
HGB BLD-MCNC: 11.3 G/DL (ref 11.7–15.4)
IMM GRANULOCYTES # BLD AUTO: 0 K/UL (ref 0–0.5)
IMM GRANULOCYTES NFR BLD AUTO: 0 % (ref 0–5)
LYMPHOCYTES # BLD: 1.5 K/UL (ref 0.5–4.6)
LYMPHOCYTES NFR BLD: 25 % (ref 13–44)
MCH RBC QN AUTO: 30.3 PG (ref 26.1–32.9)
MCHC RBC AUTO-ENTMCNC: 31.7 G/DL (ref 31.4–35)
MCV RBC AUTO: 95.4 FL (ref 82–102)
MONOCYTES # BLD: 0.4 K/UL (ref 0.1–1.3)
MONOCYTES NFR BLD: 8 % (ref 4–12)
NEUTS SEG # BLD: 3.7 K/UL (ref 1.7–8.2)
NEUTS SEG NFR BLD: 63 % (ref 43–78)
NRBC # BLD: 0 K/UL (ref 0–0.2)
PLATELET # BLD AUTO: 310 K/UL (ref 150–450)
PMV BLD AUTO: 10.1 FL (ref 9.4–12.3)
RBC # BLD AUTO: 3.73 M/UL (ref 4.05–5.2)
WBC # BLD AUTO: 5.9 K/UL (ref 4.3–11.1)

## 2023-08-07 PROCEDURE — 1090F PRES/ABSN URINE INCON ASSESS: CPT | Performed by: INTERNAL MEDICINE

## 2023-08-07 PROCEDURE — 3075F SYST BP GE 130 - 139MM HG: CPT | Performed by: INTERNAL MEDICINE

## 2023-08-07 PROCEDURE — 1123F ACP DISCUSS/DSCN MKR DOCD: CPT | Performed by: INTERNAL MEDICINE

## 2023-08-07 PROCEDURE — G8427 DOCREV CUR MEDS BY ELIG CLIN: HCPCS | Performed by: INTERNAL MEDICINE

## 2023-08-07 PROCEDURE — G8417 CALC BMI ABV UP PARAM F/U: HCPCS | Performed by: INTERNAL MEDICINE

## 2023-08-07 PROCEDURE — 3023F SPIROM DOC REV: CPT | Performed by: INTERNAL MEDICINE

## 2023-08-07 PROCEDURE — G8399 PT W/DXA RESULTS DOCUMENT: HCPCS | Performed by: INTERNAL MEDICINE

## 2023-08-07 PROCEDURE — 3078F DIAST BP <80 MM HG: CPT | Performed by: INTERNAL MEDICINE

## 2023-08-07 PROCEDURE — 1036F TOBACCO NON-USER: CPT | Performed by: INTERNAL MEDICINE

## 2023-08-07 PROCEDURE — 99214 OFFICE O/P EST MOD 30 MIN: CPT | Performed by: INTERNAL MEDICINE

## 2023-08-07 ASSESSMENT — ENCOUNTER SYMPTOMS
BLOOD IN STOOL: 0
DIARRHEA: 0
CONSTIPATION: 0
COUGH: 0
VOMITING: 0
WHEEZING: 0
NAUSEA: 0
SHORTNESS OF BREATH: 1

## 2023-08-07 NOTE — PROGRESS NOTES
current facility-administered medications for this visit. Orders Placed This Encounter   Procedures    Comprehensive Metabolic Panel     Standing Status:   Future     Standing Expiration Date:   8/7/2024    CBC with Auto Differential     Standing Status:   Future     Standing Expiration Date:   8/7/2024    TSH with Reflex     Standing Status:   Future     Standing Expiration Date:   8/7/2024    Lipid Panel     Standing Status:   Future     Standing Expiration Date:   8/7/2024    Magnesium     Standing Status:   Future     Standing Expiration Date:   8/7/2024       No orders of the defined types were placed in this encounter. Medications Discontinued During This Encounter   Medication Reason    OXYGEN Therapy completed        Diagnosis Orders   1. Chronic obstructive pulmonary disease, unspecified COPD type ([address])  CBC with Auto Differential      2. Postsurgical hypothyroidism  TSH with Reflex      3. Hypercholesterolemia  Comprehensive Metabolic Panel    Lipid Panel      4. Granulomatous disease, chronic (HCC)  CBC with Auto Differential      5. Leg cramps  Magnesium         Vitals look good. Is doing fairly well physically and emotionally. Recommended 30 minutes of aerobic exercise at least 4-5 days weekly for physical as well as emotional reasons. Will discuss labs over the phone. Urged her to consider knee replacement and to try exercises provided in the interim. Also advised exercises for leg cramps as well as tonic water and Scratch as an electrolyte replacement. Knows to keep a low threshold for contacting the office with worsening symptoms. Will discuss labs over the phone. Follow up as documented or earlier as needed. Return in about 6 months (around 2/7/2024) for GAURAV HARE.           Candance Leyden, MD

## 2023-08-08 LAB
ALBUMIN SERPL-MCNC: 3.7 G/DL (ref 3.2–4.6)
ALBUMIN/GLOB SERPL: 1.1 (ref 0.4–1.6)
ALP SERPL-CCNC: 58 U/L (ref 50–136)
ALT SERPL-CCNC: 32 U/L (ref 12–65)
ANION GAP SERPL CALC-SCNC: 7 MMOL/L (ref 2–11)
AST SERPL-CCNC: 26 U/L (ref 15–37)
BILIRUB SERPL-MCNC: 0.5 MG/DL (ref 0.2–1.1)
BUN SERPL-MCNC: 20 MG/DL (ref 8–23)
CALCIUM SERPL-MCNC: 9.3 MG/DL (ref 8.3–10.4)
CHLORIDE SERPL-SCNC: 106 MMOL/L (ref 101–110)
CHOLEST SERPL-MCNC: 159 MG/DL
CO2 SERPL-SCNC: 28 MMOL/L (ref 21–32)
CREAT SERPL-MCNC: 1.1 MG/DL (ref 0.6–1)
GLOBULIN SER CALC-MCNC: 3.3 G/DL (ref 2.8–4.5)
GLUCOSE SERPL-MCNC: 92 MG/DL (ref 65–100)
HDLC SERPL-MCNC: 61 MG/DL (ref 40–60)
HDLC SERPL: 2.6
LDLC SERPL CALC-MCNC: 85.4 MG/DL
MAGNESIUM SERPL-MCNC: 2.5 MG/DL (ref 1.8–2.4)
POTASSIUM SERPL-SCNC: 4 MMOL/L (ref 3.5–5.1)
PROT SERPL-MCNC: 7 G/DL (ref 6.3–8.2)
SODIUM SERPL-SCNC: 141 MMOL/L (ref 133–143)
TRIGL SERPL-MCNC: 63 MG/DL (ref 35–150)
TSH W FREE THYROID IF ABNORMAL: 3.18 UIU/ML (ref 0.36–3.74)
VLDLC SERPL CALC-MCNC: 12.6 MG/DL (ref 6–23)

## 2023-08-08 NOTE — RESULT ENCOUNTER NOTE
Cholesterol is better. Other labs are stable except that your magnesium level is  a little high. If you are taking a magnesium supplement, I recommend you discontinue this. Otherwise, continue your current doses of medications. Keep up the good work. Thanks.   [address]

## 2023-10-09 NOTE — PROGRESS NOTES
Name:  Tej Hernandez  YOB: 1940   MRN: [id number]      Office Visit: 10/10/2023        ASSESSMENT AND PLAN:  (Medical Decision Making)    Impression: 82 y.o. female     1. Chronic obstructive pulmonary disease, unspecified COPD type (HCC)  --moderate obstruction. WHITMAN has improved with regular use of Air Duo. Spirometry stable. Continue current therapy. - albuterol sulfate HFA (PROVENTIL;VENTOLIN;PROAIR) 108 (90 Base) MCG/ACT inhaler; Inhale 1 puff into the lungs every 4 hours as needed for Wheezing or Shortness of Breath Dx code jj44.9  Dispense: 1 g; Refill: 11  - Fluticasone-Salmeterol,sensor, (AIRDUO DIGIHALER) 113-14 MCG/ACT AEPB; Inhale 1 puff into the lungs in the morning and at bedtime Dx code j44.9  Dispense: 1 each; Refill: 11  - Spirometry Without Bronchodilator    2. Immunization counseling  --discussed need for Covid vaccine, RSV, and Prevnar-20 vaccines. - pneumococcal 20-valent conjugat (PREVNAR) 0.5 ML JASON inj; Inject 0.5 mLs into the muscle once for 1 dose  Dispense: 0.5 mL; Refill: 0  - respiratory syncytial vaccine, adjuvanted (AREXVY) 120 MCG/0.5ML injection;  Inject 0.5 mLs into the muscle once for 1 dose  Dispense: 0.5 mL; Refill: 0    Orders Placed This Encounter   Medications    albuterol sulfate HFA (PROVENTIL;VENTOLIN;PROAIR) 108 (90 Base) MCG/ACT inhaler     Sig: Inhale 1 puff into the lungs every 4 hours as needed for Wheezing or Shortness of Breath Dx code jj44.9     Dispense:  1 g     Refill:  11    Fluticasone-Salmeterol,sensor, (AIRDUO DIGIHALER) 113-14 MCG/ACT AEPB     Sig: Inhale 1 puff into the lungs in the morning and at bedtime Dx code j44.9     Dispense:  1 each     Refill:  11    pneumococcal 20-valent conjugat (PREVNAR) 0.5 ML JASON inj     Sig: Inject 0.5 mLs into the muscle once for 1 dose     Dispense:  0.5 mL     Refill:  0    respiratory syncytial vaccine, adjuvanted (AREXVY) 120 MCG/0.5ML injection     Sig: Inject 0.5 mLs into the muscle once for 1

## 2023-10-10 ENCOUNTER — OFFICE VISIT (OUTPATIENT)
Dept: PULMONOLOGY | Age: 83
End: 2023-10-10
Payer: MEDICARE

## 2023-10-10 VITALS
HEIGHT: 65 IN | DIASTOLIC BLOOD PRESSURE: 74 MMHG | OXYGEN SATURATION: 96 % | RESPIRATION RATE: 18 BRPM | WEIGHT: 157.9 LBS | BODY MASS INDEX: 26.31 KG/M2 | TEMPERATURE: 97.6 F | HEART RATE: 75 BPM | SYSTOLIC BLOOD PRESSURE: 136 MMHG

## 2023-10-10 DIAGNOSIS — J44.9 CHRONIC OBSTRUCTIVE PULMONARY DISEASE, UNSPECIFIED COPD TYPE (HCC): Primary | ICD-10-CM

## 2023-10-10 DIAGNOSIS — Z71.85 IMMUNIZATION COUNSELING: ICD-10-CM

## 2023-10-10 PROBLEM — U07.1 COVID: Status: RESOLVED | Noted: 2021-09-20 | Resolved: 2023-10-10

## 2023-10-10 LAB
EXPIRATORY TIME: NORMAL
FEF 25-75% %PRED-PRE: NORMAL
FEF 25-75% PRED: NORMAL
FEF 25-75%-PRE: NORMAL
FEV1 %PRED-PRE: 62 %
FEV1 PRED: NORMAL
FEV1/FVC %PRED-PRE: NORMAL
FEV1/FVC PRED: NORMAL
FEV1/FVC: 64 %
FEV1: 1.24 L
FVC %PRED-PRE: 72 %
FVC PRED: NORMAL
FVC: 1.94 L
PEF %PRED-PRE: NORMAL
PEF PRED: NORMAL
PEF-PRE: NORMAL

## 2023-10-10 PROCEDURE — 3075F SYST BP GE 130 - 139MM HG: CPT | Performed by: NURSE PRACTITIONER

## 2023-10-10 PROCEDURE — 1090F PRES/ABSN URINE INCON ASSESS: CPT | Performed by: NURSE PRACTITIONER

## 2023-10-10 PROCEDURE — 3023F SPIROM DOC REV: CPT | Performed by: NURSE PRACTITIONER

## 2023-10-10 PROCEDURE — 1123F ACP DISCUSS/DSCN MKR DOCD: CPT | Performed by: NURSE PRACTITIONER

## 2023-10-10 PROCEDURE — 1036F TOBACCO NON-USER: CPT | Performed by: NURSE PRACTITIONER

## 2023-10-10 PROCEDURE — 99213 OFFICE O/P EST LOW 20 MIN: CPT | Performed by: NURSE PRACTITIONER

## 2023-10-10 PROCEDURE — G8417 CALC BMI ABV UP PARAM F/U: HCPCS | Performed by: NURSE PRACTITIONER

## 2023-10-10 PROCEDURE — 94010 BREATHING CAPACITY TEST: CPT | Performed by: INTERNAL MEDICINE

## 2023-10-10 PROCEDURE — G8427 DOCREV CUR MEDS BY ELIG CLIN: HCPCS | Performed by: NURSE PRACTITIONER

## 2023-10-10 PROCEDURE — G8484 FLU IMMUNIZE NO ADMIN: HCPCS | Performed by: NURSE PRACTITIONER

## 2023-10-10 PROCEDURE — G8399 PT W/DXA RESULTS DOCUMENT: HCPCS | Performed by: NURSE PRACTITIONER

## 2023-10-10 PROCEDURE — 3078F DIAST BP <80 MM HG: CPT | Performed by: NURSE PRACTITIONER

## 2023-10-10 RX ORDER — ALBUTEROL SULFATE 90 UG/1
1 AEROSOL, METERED RESPIRATORY (INHALATION) EVERY 4 HOURS PRN
Qty: 1 G | Refills: 11 | Status: SHIPPED | OUTPATIENT
Start: 2023-10-10

## 2023-10-10 RX ORDER — FLUTICASONE PROPIONATE AND SALMETEROL 113; 14 UG/1; UG/1
1 POWDER, METERED RESPIRATORY (INHALATION) 2 TIMES DAILY
Qty: 1 EACH | Refills: 11 | Status: SHIPPED | OUTPATIENT
Start: 2023-10-10

## 2023-10-10 ASSESSMENT — PULMONARY FUNCTION TESTS
FVC: 1.94
FVC_PERCENT_PREDICTED_PRE: 72
FEV1: 1.24
FEV1/FVC: 64
FEV1_PERCENT_PREDICTED_PRE: 62

## 2023-11-22 RX ORDER — LEVOTHYROXINE SODIUM 88 UG/1
TABLET ORAL
Qty: 30 TABLET | Refills: 11 | Status: SHIPPED | OUTPATIENT
Start: 2023-11-22

## 2023-11-22 RX ORDER — LEVOTHYROXINE SODIUM 88 UG/1
TABLET ORAL
Qty: 30 TABLET | Refills: 11 | OUTPATIENT
Start: 2023-11-22

## 2023-12-28 RX ORDER — OMEPRAZOLE 20 MG/1
20 CAPSULE, DELAYED RELEASE ORAL DAILY
Qty: 90 CAPSULE | Refills: 3 | Status: SHIPPED | OUTPATIENT
Start: 2023-12-28

## 2024-01-29 RX ORDER — HYDROCHLOROTHIAZIDE 12.5 MG/1
TABLET ORAL
Qty: 90 TABLET | Refills: 3 | Status: SHIPPED | OUTPATIENT
Start: 2024-01-29

## 2024-02-15 ENCOUNTER — OFFICE VISIT (OUTPATIENT)
Dept: INTERNAL MEDICINE CLINIC | Facility: CLINIC | Age: 84
End: 2024-02-15
Payer: MEDICARE

## 2024-02-15 VITALS
WEIGHT: 152 LBS | HEART RATE: 75 BPM | HEIGHT: 65 IN | DIASTOLIC BLOOD PRESSURE: 67 MMHG | SYSTOLIC BLOOD PRESSURE: 158 MMHG | BODY MASS INDEX: 25.33 KG/M2 | RESPIRATION RATE: 16 BRPM

## 2024-02-15 DIAGNOSIS — I10 PRIMARY HYPERTENSION: Primary | ICD-10-CM

## 2024-02-15 DIAGNOSIS — E89.0 POSTSURGICAL HYPOTHYROIDISM: ICD-10-CM

## 2024-02-15 DIAGNOSIS — D71 GRANULOMATOUS DISEASE, CHRONIC (HCC): ICD-10-CM

## 2024-02-15 DIAGNOSIS — Z00.00 MEDICARE ANNUAL WELLNESS VISIT, SUBSEQUENT: ICD-10-CM

## 2024-02-15 DIAGNOSIS — J44.9 CHRONIC OBSTRUCTIVE PULMONARY DISEASE, UNSPECIFIED COPD TYPE (HCC): ICD-10-CM

## 2024-02-15 DIAGNOSIS — Z12.31 VISIT FOR SCREENING MAMMOGRAM: ICD-10-CM

## 2024-02-15 DIAGNOSIS — E78.00 HYPERCHOLESTEROLEMIA: ICD-10-CM

## 2024-02-15 LAB
ALBUMIN SERPL-MCNC: 3.9 G/DL (ref 3.2–4.6)
ALBUMIN/GLOB SERPL: 1.1 (ref 0.4–1.6)
ALP SERPL-CCNC: 60 U/L (ref 50–136)
ALT SERPL-CCNC: 34 U/L (ref 12–65)
ANION GAP SERPL CALC-SCNC: 7 MMOL/L (ref 2–11)
AST SERPL-CCNC: 32 U/L (ref 15–37)
BASOPHILS # BLD: 0.1 K/UL (ref 0–0.2)
BASOPHILS NFR BLD: 1 % (ref 0–2)
BILIRUB SERPL-MCNC: 0.4 MG/DL (ref 0.2–1.1)
BUN SERPL-MCNC: 19 MG/DL (ref 8–23)
CALCIUM SERPL-MCNC: 9.5 MG/DL (ref 8.3–10.4)
CHLORIDE SERPL-SCNC: 107 MMOL/L (ref 103–113)
CHOLEST SERPL-MCNC: 182 MG/DL
CO2 SERPL-SCNC: 29 MMOL/L (ref 21–32)
CREAT SERPL-MCNC: 1 MG/DL (ref 0.6–1)
DIFFERENTIAL METHOD BLD: ABNORMAL
EOSINOPHIL # BLD: 0.2 K/UL (ref 0–0.8)
EOSINOPHIL NFR BLD: 3 % (ref 0.5–7.8)
ERYTHROCYTE [DISTWIDTH] IN BLOOD BY AUTOMATED COUNT: 13.1 % (ref 11.9–14.6)
GLOBULIN SER CALC-MCNC: 3.4 G/DL (ref 2.8–4.5)
GLUCOSE SERPL-MCNC: 92 MG/DL (ref 65–100)
HCT VFR BLD AUTO: 36.4 % (ref 35.8–46.3)
HDLC SERPL-MCNC: 57 MG/DL (ref 40–60)
HDLC SERPL: 3.2
HGB BLD-MCNC: 11.8 G/DL (ref 11.7–15.4)
IMM GRANULOCYTES # BLD AUTO: 0 K/UL (ref 0–0.5)
IMM GRANULOCYTES NFR BLD AUTO: 0 % (ref 0–5)
LDLC SERPL CALC-MCNC: 105.2 MG/DL
LYMPHOCYTES # BLD: 1.9 K/UL (ref 0.5–4.6)
LYMPHOCYTES NFR BLD: 33 % (ref 13–44)
MCH RBC QN AUTO: 30.2 PG (ref 26.1–32.9)
MCHC RBC AUTO-ENTMCNC: 32.4 G/DL (ref 31.4–35)
MCV RBC AUTO: 93.1 FL (ref 82–102)
MONOCYTES # BLD: 0.4 K/UL (ref 0.1–1.3)
MONOCYTES NFR BLD: 7 % (ref 4–12)
NEUTS SEG # BLD: 3.2 K/UL (ref 1.7–8.2)
NEUTS SEG NFR BLD: 56 % (ref 43–78)
NRBC # BLD: 0 K/UL (ref 0–0.2)
PLATELET # BLD AUTO: 304 K/UL (ref 150–450)
PMV BLD AUTO: 9.7 FL (ref 9.4–12.3)
POTASSIUM SERPL-SCNC: 3.9 MMOL/L (ref 3.5–5.1)
PROT SERPL-MCNC: 7.3 G/DL (ref 6.3–8.2)
RBC # BLD AUTO: 3.91 M/UL (ref 4.05–5.2)
SODIUM SERPL-SCNC: 143 MMOL/L (ref 136–146)
T4 FREE SERPL-MCNC: 1.1 NG/DL (ref 0.78–1.46)
TRIGL SERPL-MCNC: 99 MG/DL (ref 35–150)
TSH W FREE THYROID IF ABNORMAL: 16.7 UIU/ML (ref 0.36–3.74)
VLDLC SERPL CALC-MCNC: 19.8 MG/DL (ref 6–23)
WBC # BLD AUTO: 5.8 K/UL (ref 4.3–11.1)

## 2024-02-15 PROCEDURE — 3077F SYST BP >= 140 MM HG: CPT | Performed by: INTERNAL MEDICINE

## 2024-02-15 PROCEDURE — 3023F SPIROM DOC REV: CPT | Performed by: INTERNAL MEDICINE

## 2024-02-15 PROCEDURE — 1090F PRES/ABSN URINE INCON ASSESS: CPT | Performed by: INTERNAL MEDICINE

## 2024-02-15 PROCEDURE — 1123F ACP DISCUSS/DSCN MKR DOCD: CPT | Performed by: INTERNAL MEDICINE

## 2024-02-15 PROCEDURE — 3078F DIAST BP <80 MM HG: CPT | Performed by: INTERNAL MEDICINE

## 2024-02-15 PROCEDURE — G0439 PPPS, SUBSEQ VISIT: HCPCS | Performed by: INTERNAL MEDICINE

## 2024-02-15 PROCEDURE — G8427 DOCREV CUR MEDS BY ELIG CLIN: HCPCS | Performed by: INTERNAL MEDICINE

## 2024-02-15 PROCEDURE — 99213 OFFICE O/P EST LOW 20 MIN: CPT | Performed by: INTERNAL MEDICINE

## 2024-02-15 PROCEDURE — G8417 CALC BMI ABV UP PARAM F/U: HCPCS | Performed by: INTERNAL MEDICINE

## 2024-02-15 PROCEDURE — G8399 PT W/DXA RESULTS DOCUMENT: HCPCS | Performed by: INTERNAL MEDICINE

## 2024-02-15 PROCEDURE — G8484 FLU IMMUNIZE NO ADMIN: HCPCS | Performed by: INTERNAL MEDICINE

## 2024-02-15 PROCEDURE — 1036F TOBACCO NON-USER: CPT | Performed by: INTERNAL MEDICINE

## 2024-02-15 NOTE — PATIENT INSTRUCTIONS
Small steps build into big accomplishments.  Staying motivated.  If you feel like skipping your activity, remember your goal. Maybe you want to move better and stay independent. Every activity gets you one step closer.  Not feeling your best.  Start with 5 minutes of an activity you enjoy. Prove to yourself you can do it. As you get comfortable, increase your time.  You may not be where you want to be. But you're in the process of getting there. Everyone starts somewhere.  How can you find safe ways to stay active?  Talk with your doctor about any physical challenges you're facing. Make a plan with your doctor if you have a health problem or aren't sure how to get started with activity.  If you're already active, ask your doctor if there is anything you should change to stay safe as your body and health change.  If you tend to feel dizzy after you take medicine, avoid activity at that time. Try being active before you take your medicine. This will reduce your risk of falls.  If you plan to be active at home, make sure to clear your space before you get started. Remove things like TV cords, coffee tables, and throw rugs. It's safest to have plenty of space to move freely.  The key to getting more active is to take it slow and steady. Try to improve only a little bit at a time. Pick just one area to improve on at first. And if an activity hurts, stop and talk to your doctor.  Where can you learn more?  Go to https://www.Hemenkiralik.com.net/patientEd and enter P600 to learn more about \"Learning About Being Active as an Older Adult.\"  Current as of: June 6, 2023               Content Version: 13.9  © 9095-5480 [employer].   Care instructions adapted under license by [employer]. If you have questions about a medical condition or this instruction, always ask your healthcare professional. [employer] disclaims any warranty or liability for your use of this information.           A Healthy Heart:

## 2024-02-15 NOTE — PROGRESS NOTES
Number of Occurrences:   1     Standing Expiration Date:   2/15/2025    Lipid Panel     Standing Status:   Future     Number of Occurrences:   1     Standing Expiration Date:   2/15/2025    CBC with Auto Differential     Standing Status:   Future     Number of Occurrences:   1     Standing Expiration Date:   2/15/2025       No orders of the defined types were placed in this encounter.      Medications Discontinued During This Encounter   Medication Reason    Fluticasone-Salmeterol,sensor, (AIRDUO DIGIHALER) 113-14 MCG/ACT AEPB LIST CLEANUP        Diagnosis Orders   1. Primary hypertension  JASVIR CORBIN DIGITAL SCREEN BILATERAL    Urinalysis    Comprehensive Metabolic Panel    TSH with Reflex    Lipid Panel    CBC with Auto Differential    CBC with Auto Differential    Lipid Panel    TSH with Reflex    Comprehensive Metabolic Panel      2. Medicare annual wellness visit, subsequent  JASVIR CORBIN DIGITAL SCREEN BILATERAL    Urinalysis    Comprehensive Metabolic Panel    TSH with Reflex    Lipid Panel    CBC with Auto Differential    CBC with Auto Differential    Lipid Panel    TSH with Reflex    Comprehensive Metabolic Panel      3. Chronic obstructive pulmonary disease, unspecified COPD type (HCC)        4. Postsurgical hypothyroidism  TSH with Reflex    TSH with Reflex      5. Hypercholesterolemia  Lipid Panel    Lipid Panel      6. Visit for screening mammogram  JASVIR CORBIN DIGITAL SCREEN BILATERAL      7. Granulomatous disease, chronic (HCC)           Wellness information reviewed and appropriate screening addressed. Advised patient to secure living will and healthcare POA. Is doing fairly well physically and emotionally.  Document BP several times on two days weekly.  Contact office if not <=130/80 consistently.   No doubt elevated due to not taking meds yet today.  Knows to keep a low threshold for contacting the office with worsening symptoms.  Will discuss labs over the phone.    Follow up as documented or earlier as

## 2024-02-19 ENCOUNTER — TELEPHONE (OUTPATIENT)
Dept: INTERNAL MEDICINE CLINIC | Facility: CLINIC | Age: 84
End: 2024-02-19

## 2024-02-19 DIAGNOSIS — E89.0 POSTSURGICAL HYPOTHYROIDISM: Primary | ICD-10-CM

## 2024-02-19 RX ORDER — LEVOTHYROXINE SODIUM 88 UG/1
TABLET ORAL
Qty: 30 TABLET | Refills: 11 | Status: SHIPPED | OUTPATIENT
Start: 2024-02-19

## 2024-02-19 RX ORDER — LEVOTHYROXINE SODIUM 0.07 MG/1
TABLET ORAL
Qty: 30 TABLET | Refills: 11 | Status: SHIPPED | OUTPATIENT
Start: 2024-02-19

## 2024-02-19 NOTE — TELEPHONE ENCOUNTER
Your labs look great except that your thyroid is not adequately replaced.  Please take the 75 mcg on Monday, Wednesday and Friday and take the 88 mcg on Tuesday, Thursday, Saturday and Sunday.  We need to repeat your labs in 6 weeks to ensure this is the right dosage.  Schedule labs as ordered please. Thanks.  JESSICA

## 2024-03-08 RX ORDER — LOSARTAN POTASSIUM 50 MG/1
TABLET ORAL
Qty: 90 TABLET | Refills: 3 | Status: SHIPPED | OUTPATIENT
Start: 2024-03-08

## 2024-03-12 ENCOUNTER — TELEPHONE (OUTPATIENT)
Dept: INTERNAL MEDICINE CLINIC | Facility: CLINIC | Age: 84
End: 2024-03-12

## 2024-03-12 NOTE — TELEPHONE ENCOUNTER
----- Message from Young Velarde sent at 3/12/2024  3:25 PM EDT -----  Subject: Message to Provider    QUESTIONS  Information for Provider? patient is calling to reschedule labs as she is   unable to keep the schedule 4/1/24 patient wants to reschedule for 4/09/   or 4/12/24  ---------------------------------------------------------------------------  --------------  CALL BACK INFO  [phone]; OK to leave message on voicemail  ---------------------------------------------------------------------------  --------------  SCRIPT ANSWERS  Relationship to Patient? Self

## 2024-03-25 RX ORDER — LOVASTATIN 20 MG/1
20 TABLET ORAL NIGHTLY
Qty: 90 TABLET | Refills: 3 | Status: SHIPPED | OUTPATIENT
Start: 2024-03-25

## 2024-03-25 RX ORDER — LOVASTATIN 20 MG/1
20 TABLET ORAL NIGHTLY
Qty: 60 TABLET | Refills: 11 | Status: SHIPPED | OUTPATIENT
Start: 2024-03-25 | End: 2024-03-25

## 2024-04-12 ENCOUNTER — NURSE ONLY (OUTPATIENT)
Dept: INTERNAL MEDICINE CLINIC | Facility: CLINIC | Age: 84
End: 2024-04-12

## 2024-04-12 DIAGNOSIS — E89.0 POSTSURGICAL HYPOTHYROIDISM: ICD-10-CM

## 2024-04-12 LAB
T4 FREE SERPL-MCNC: 1.2 NG/DL (ref 0.78–1.46)
TSH W FREE THYROID IF ABNORMAL: 6.75 UIU/ML (ref 0.36–3.74)

## 2024-04-16 ENCOUNTER — OFFICE VISIT (OUTPATIENT)
Dept: PULMONOLOGY | Age: 84
End: 2024-04-16
Payer: MEDICARE

## 2024-04-16 VITALS
DIASTOLIC BLOOD PRESSURE: 70 MMHG | SYSTOLIC BLOOD PRESSURE: 128 MMHG | HEIGHT: 65 IN | OXYGEN SATURATION: 97 % | HEART RATE: 78 BPM | BODY MASS INDEX: 24.91 KG/M2 | RESPIRATION RATE: 18 BRPM | TEMPERATURE: 97.6 F | WEIGHT: 149.5 LBS

## 2024-04-16 DIAGNOSIS — G47.34 NOCTURNAL HYPOXEMIA: ICD-10-CM

## 2024-04-16 DIAGNOSIS — R91.8 LUNG NODULES: ICD-10-CM

## 2024-04-16 DIAGNOSIS — J44.89 COPD WITH ASTHMA (HCC): Primary | ICD-10-CM

## 2024-04-16 LAB — FENO: 82 PPB

## 2024-04-16 PROCEDURE — G8399 PT W/DXA RESULTS DOCUMENT: HCPCS | Performed by: NURSE PRACTITIONER

## 2024-04-16 PROCEDURE — 1036F TOBACCO NON-USER: CPT | Performed by: NURSE PRACTITIONER

## 2024-04-16 PROCEDURE — 99214 OFFICE O/P EST MOD 30 MIN: CPT | Performed by: NURSE PRACTITIONER

## 2024-04-16 PROCEDURE — 3023F SPIROM DOC REV: CPT | Performed by: NURSE PRACTITIONER

## 2024-04-16 PROCEDURE — 1123F ACP DISCUSS/DSCN MKR DOCD: CPT | Performed by: NURSE PRACTITIONER

## 2024-04-16 PROCEDURE — G8427 DOCREV CUR MEDS BY ELIG CLIN: HCPCS | Performed by: NURSE PRACTITIONER

## 2024-04-16 PROCEDURE — 3074F SYST BP LT 130 MM HG: CPT | Performed by: NURSE PRACTITIONER

## 2024-04-16 PROCEDURE — 1090F PRES/ABSN URINE INCON ASSESS: CPT | Performed by: NURSE PRACTITIONER

## 2024-04-16 PROCEDURE — G8420 CALC BMI NORM PARAMETERS: HCPCS | Performed by: NURSE PRACTITIONER

## 2024-04-16 PROCEDURE — 3078F DIAST BP <80 MM HG: CPT | Performed by: NURSE PRACTITIONER

## 2024-04-16 RX ORDER — FLUTICASONE PROPIONATE AND SALMETEROL 113; 14 UG/1; UG/1
1 POWDER, METERED RESPIRATORY (INHALATION) 2 TIMES DAILY
Qty: 1 EACH | Refills: 11 | Status: SHIPPED | OUTPATIENT
Start: 2024-04-16

## 2024-04-16 ASSESSMENT — PULMONARY FUNCTION TESTS: FENO: 82

## 2024-04-16 NOTE — PROGRESS NOTES
times daily    hydroCHLOROthiazide 12.5 MG tablet TAKE ONE TABLET BY MOUTH EVERY DAY    levothyroxine (SYNTHROID) 75 MCG tablet TAKE ONE TABLET BY MOUTH ON MONDAY, WEDNESDAY AND FRIDAY    levothyroxine (SYNTHROID) 88 MCG tablet TAKE ONE TABLET BY MOUTH EVERY DAY FOR TUESDAY, THURSDAY, SATURDAY AND SUNDAY    losartan (COZAAR) 50 MG tablet TAKE ONE TABLET BY MOUTH EVERY DAY    lovastatin (MEVACOR) 20 mg, Oral, NIGHTLY    omeprazole (PRILOSEC) 20 mg, Oral, DAILY

## 2024-05-07 ENCOUNTER — OFFICE VISIT (OUTPATIENT)
Dept: INTERNAL MEDICINE CLINIC | Facility: CLINIC | Age: 84
End: 2024-05-07

## 2024-05-07 VITALS
OXYGEN SATURATION: 93 % | BODY MASS INDEX: 24.89 KG/M2 | WEIGHT: 149.4 LBS | DIASTOLIC BLOOD PRESSURE: 63 MMHG | HEIGHT: 65 IN | TEMPERATURE: 100.4 F | HEART RATE: 106 BPM | SYSTOLIC BLOOD PRESSURE: 126 MMHG

## 2024-05-07 DIAGNOSIS — J44.9 CHRONIC OBSTRUCTIVE PULMONARY DISEASE, UNSPECIFIED COPD TYPE (HCC): ICD-10-CM

## 2024-05-07 DIAGNOSIS — J20.9 ACUTE BRONCHITIS, UNSPECIFIED ORGANISM: Primary | ICD-10-CM

## 2024-05-07 DIAGNOSIS — R00.0 TACHYCARDIA: ICD-10-CM

## 2024-05-07 DIAGNOSIS — B37.31 VAGINAL YEAST INFECTION: ICD-10-CM

## 2024-05-07 RX ORDER — FLUCONAZOLE 150 MG/1
150 TABLET ORAL ONCE
Qty: 1 TABLET | Refills: 0 | Status: SHIPPED | OUTPATIENT
Start: 2024-05-07 | End: 2024-05-07

## 2024-05-07 RX ORDER — GABAPENTIN 100 MG/1
100 CAPSULE ORAL 3 TIMES DAILY
COMMUNITY

## 2024-05-07 ASSESSMENT — ENCOUNTER SYMPTOMS
VOMITING: 0
NAUSEA: 0
CONSTIPATION: 0
SHORTNESS OF BREATH: 0
ABDOMINAL DISTENTION: 0
CHEST TIGHTNESS: 0
ABDOMINAL PAIN: 0
COUGH: 0
DIARRHEA: 0

## 2024-05-07 NOTE — PROGRESS NOTES
[facility]  [address] [address]     Office Visit    Renita Mcwilliams   1940 05/07/24       Subjective:     Chief Complaint   Patient presents with    Follow-up     ER f/u from 4/24, bronchitis. Pt states she still has a slight cough and fever.     Hypothyroidism     Pt states TSH has been high and she is confused as to what current dose of medication she should be taking.         History of Present illness:  Ms. Mcwilliams is a very pleasant 84 y.o. female with PMH of COPD who presents for the above complaints. She was seen at [facility] ER and treated for bronchitis. She has pulmonologist. She recently dx with some asthma. The cough is much improved. Still has some residual cough with clear secretions.  Last pulmonology visit she states that she was diagnosed with asthma.  Per pulmonology note on 4/16/24, COPD with asthma, FeNO was significantly elevated.  AirDuo was resumed at that time. During ER visit, WBC elevated with elevated neutrophils. She was given IV fluids and IV Rocephin in the ER. She was continued on Levaquin for 7 days afterwards. She states she is feeling better.    Had questions about her TSH and medication regimen. She also had questions about labs done at hospital. UA with probable UTI in ER. She did receive BSA and denies any symptoms of UTI today.     She c/o vaginal pruritus and blistering to labia. She declines vag exam today.     On exam, noted to be tachycardic. She did play tennis today and admitted to drinking a little less than normal. EKG with sinus tachycardia. No ectopy.    Objective:     Allergies:    Allergies   Allergen Reactions    Sulfa Antibiotics Nausea Only and Other (See Comments)     dizzy        Medical History:    Past Medical History:   Diagnosis Date    Actinic keratosis 7/24/2015    Arthritis     Benign neoplasm of colon 7/24/2015    Closed fracture of nasal bone 6/21/2017    Last Assessment & Plan:  Formatting of this note might be

## 2024-08-26 NOTE — TELEPHONE ENCOUNTER
Spoke with the patient - she had questions regarding the synthroid 88 mcg  - she is running out of medication at the pharmacy - her dose was changed, she is taking the 88 mcg 4 days a week and wants to  #30  at the pharmacy.   A Rx was sent in for #30 - told pt to call the pharmacy to  the new Rx on file.

## 2024-08-26 NOTE — TELEPHONE ENCOUNTER
Please call the patient she has some questions about how she takes her Levothyroxine     [phone] Home  [phone] Cell

## 2024-08-27 DIAGNOSIS — E89.0 POSTSURGICAL HYPOTHYROIDISM: Primary | ICD-10-CM

## 2024-08-27 RX ORDER — LEVOTHYROXINE SODIUM 88 UG/1
TABLET ORAL
Qty: 30 TABLET | Refills: 11 | Status: SHIPPED | OUTPATIENT
Start: 2024-08-27

## 2024-08-27 RX ORDER — LEVOTHYROXINE SODIUM 75 UG/1
TABLET ORAL
Qty: 30 TABLET | Refills: 11 | Status: SHIPPED | OUTPATIENT
Start: 2024-08-27

## 2024-08-27 NOTE — TELEPHONE ENCOUNTER
Pt of Dr Smalls   Called the pharmacy, they need an updated Rx sent in for the Levothyroxine 88 mcg and 75 mcg, with the correct days  - she would like a 30 day Rx sent in.     88 mcg is  Tue,Thurs,Sat, and Sunday  75 mcg is Mon, Wed,Friday     Corrected in chart.  Please send in Rx

## 2024-08-27 NOTE — TELEPHONE ENCOUNTER
Pharmacy states they did not receive the Rx that was sent yesterday. Can you re-send to the [facility] in [place] on [address], please?

## 2024-08-29 DIAGNOSIS — E89.0 POSTSURGICAL HYPOTHYROIDISM: ICD-10-CM

## 2024-08-29 LAB — TSH W FREE THYROID IF ABNORMAL: 1.93 UIU/ML (ref 0.27–4.2)

## 2024-09-17 NOTE — PROGRESS NOTES
Pre-op prayer request received. Prayer and support given to patient, her  and her daughter. Rev.  [employer] [facility]  Cardiology  [address] [place] [address]  Phone: [phone]  Fax: [phone]  Follow Up Time: 2 weeks

## 2024-10-09 NOTE — TELEPHONE ENCOUNTER
Medication Refill Request      Name of Medication : omeprazole (PRILOSEC)       Strength of Medication: 20 MG delayed release capsule       Directions: TAKE ONE CAPSULE BY MOUTH EVERY DAY, Disp-90 capsule       30 day or 90 day supply: 90      Preferred Pharmacy: [facility] [place] [address] - P [phone] - F [phone]     Additional Information For Provider:

## 2024-10-15 NOTE — PROGRESS NOTES
108 (90 Base) MCG/ACT inhaler 1 puff, Inhalation, EVERY 4 HOURS PRN, Dx code jj44.9    albuterol sulfate HFA (VENTOLIN HFA) 108 (90 Base) MCG/ACT inhaler 2 puffs, Inhalation, EVERY 4 HOURS PRN    aspirin 81 mg, Oral, DAILY    azelastine (ASTELIN) 0.1 % nasal spray 1 spray, Nasal, 2 TIMES DAILY, Use in each nostril as directed    BIOTIN PO 1 capsule, Oral, DAILY    Calcium Carb-Cholecalciferol 600-400 MG-UNIT CAPS 1 tablet, Oral, DAILY    Fluticasone-Salmeterol (AIRDUO RESPICLICK 113/14) 113-14 MCG/ACT AEPB 1 puff, Inhalation, 2 times daily    gabapentin (NEURONTIN) 100 mg, Oral, 3 TIMES DAILY    hydroCHLOROthiazide 12.5 MG tablet TAKE ONE TABLET BY MOUTH EVERY DAY    levothyroxine (SYNTHROID) 75 MCG tablet TAKE ONE TABLET BY MOUTH ON MONDAY, WEDNESDAY AND FRIDAY    levothyroxine (SYNTHROID) 88 MCG tablet TAKE ONE TABLET BY MOUTH EVERY DAY FOR TUESDAY, THURSDAY, SATURDAY AND SUNDAY    losartan (COZAAR) 50 MG tablet TAKE ONE TABLET BY MOUTH EVERY DAY    lovastatin (MEVACOR) 20 mg, Oral, NIGHTLY    omeprazole (PRILOSEC) 20 mg, Oral, DAILY

## 2024-10-16 ENCOUNTER — OFFICE VISIT (OUTPATIENT)
Dept: PULMONOLOGY | Age: 84
End: 2024-10-16
Payer: MEDICARE

## 2024-10-16 VITALS
SYSTOLIC BLOOD PRESSURE: 136 MMHG | WEIGHT: 148.5 LBS | DIASTOLIC BLOOD PRESSURE: 82 MMHG | RESPIRATION RATE: 18 BRPM | OXYGEN SATURATION: 98 % | TEMPERATURE: 97 F | HEART RATE: 77 BPM | BODY MASS INDEX: 26.31 KG/M2 | HEIGHT: 63 IN

## 2024-10-16 DIAGNOSIS — J00 ACUTE RHINITIS: ICD-10-CM

## 2024-10-16 DIAGNOSIS — J44.89 COPD WITH ASTHMA (HCC): Primary | ICD-10-CM

## 2024-10-16 DIAGNOSIS — Z71.85 IMMUNIZATION COUNSELING: ICD-10-CM

## 2024-10-16 DIAGNOSIS — R91.8 LUNG NODULES: ICD-10-CM

## 2024-10-16 PROCEDURE — G8417 CALC BMI ABV UP PARAM F/U: HCPCS | Performed by: NURSE PRACTITIONER

## 2024-10-16 PROCEDURE — 3075F SYST BP GE 130 - 139MM HG: CPT | Performed by: NURSE PRACTITIONER

## 2024-10-16 PROCEDURE — G8484 FLU IMMUNIZE NO ADMIN: HCPCS | Performed by: NURSE PRACTITIONER

## 2024-10-16 PROCEDURE — G8427 DOCREV CUR MEDS BY ELIG CLIN: HCPCS | Performed by: NURSE PRACTITIONER

## 2024-10-16 PROCEDURE — 3079F DIAST BP 80-89 MM HG: CPT | Performed by: NURSE PRACTITIONER

## 2024-10-16 PROCEDURE — G2211 COMPLEX E/M VISIT ADD ON: HCPCS | Performed by: NURSE PRACTITIONER

## 2024-10-16 PROCEDURE — 3023F SPIROM DOC REV: CPT | Performed by: NURSE PRACTITIONER

## 2024-10-16 PROCEDURE — G8399 PT W/DXA RESULTS DOCUMENT: HCPCS | Performed by: NURSE PRACTITIONER

## 2024-10-16 PROCEDURE — 99214 OFFICE O/P EST MOD 30 MIN: CPT | Performed by: NURSE PRACTITIONER

## 2024-10-16 PROCEDURE — 1036F TOBACCO NON-USER: CPT | Performed by: NURSE PRACTITIONER

## 2024-10-16 PROCEDURE — 1090F PRES/ABSN URINE INCON ASSESS: CPT | Performed by: NURSE PRACTITIONER

## 2024-10-16 PROCEDURE — 1123F ACP DISCUSS/DSCN MKR DOCD: CPT | Performed by: NURSE PRACTITIONER

## 2024-10-16 RX ORDER — ALBUTEROL SULFATE 90 UG/1
2 INHALANT RESPIRATORY (INHALATION) EVERY 4 HOURS PRN
COMMUNITY

## 2024-10-16 RX ORDER — ALBUTEROL SULFATE 90 UG/1
1 INHALANT RESPIRATORY (INHALATION) EVERY 4 HOURS PRN
Qty: 1 G | Refills: 11 | Status: CANCELLED | OUTPATIENT
Start: 2024-10-16

## 2024-10-16 RX ORDER — ALBUTEROL SULFATE 90 UG/1
2 INHALANT RESPIRATORY (INHALATION) EVERY 4 HOURS PRN
COMMUNITY
End: 2024-10-16

## 2024-10-16 RX ORDER — AZELASTINE 1 MG/ML
1 SPRAY, METERED NASAL 2 TIMES DAILY
Qty: 1 EACH | Refills: 3 | Status: SHIPPED | OUTPATIENT
Start: 2024-10-16

## 2025-02-04 ENCOUNTER — OFFICE VISIT (OUTPATIENT)
Dept: INTERNAL MEDICINE CLINIC | Facility: CLINIC | Age: 85
End: 2025-02-04
Payer: MEDICARE

## 2025-02-04 VITALS
WEIGHT: 144 LBS | DIASTOLIC BLOOD PRESSURE: 70 MMHG | RESPIRATION RATE: 16 BRPM | HEIGHT: 63 IN | HEART RATE: 65 BPM | BODY MASS INDEX: 25.52 KG/M2 | SYSTOLIC BLOOD PRESSURE: 152 MMHG

## 2025-02-04 DIAGNOSIS — Z12.31 VISIT FOR SCREENING MAMMOGRAM: ICD-10-CM

## 2025-02-04 DIAGNOSIS — I10 PRIMARY HYPERTENSION: ICD-10-CM

## 2025-02-04 DIAGNOSIS — J44.89 COPD WITH ASTHMA (HCC): ICD-10-CM

## 2025-02-04 DIAGNOSIS — L57.0 ACTINIC KERATOSIS: ICD-10-CM

## 2025-02-04 DIAGNOSIS — E89.0 POSTSURGICAL HYPOTHYROIDISM: ICD-10-CM

## 2025-02-04 DIAGNOSIS — N63.22 MASS OF UPPER INNER QUADRANT OF LEFT BREAST: Primary | ICD-10-CM

## 2025-02-04 DIAGNOSIS — K57.30 DIVERTICULOSIS OF LARGE INTESTINE WITHOUT HEMORRHAGE: ICD-10-CM

## 2025-02-04 DIAGNOSIS — Z13.820 SCREENING FOR OSTEOPOROSIS: ICD-10-CM

## 2025-02-04 DIAGNOSIS — Z78.0 MENOPAUSE: ICD-10-CM

## 2025-02-04 DIAGNOSIS — D71 GRANULOMATOUS DISEASE, CHRONIC (HCC): ICD-10-CM

## 2025-02-04 DIAGNOSIS — E78.00 HYPERCHOLESTEROLEMIA: ICD-10-CM

## 2025-02-04 LAB
ALBUMIN SERPL-MCNC: 4.1 G/DL (ref 3.2–4.6)
ALBUMIN/GLOB SERPL: 1.3 (ref 1–1.9)
ALP SERPL-CCNC: 60 U/L (ref 35–104)
ALT SERPL-CCNC: 26 U/L (ref 8–45)
ANION GAP SERPL CALC-SCNC: 12 MMOL/L (ref 7–16)
APPEARANCE UR: CLEAR
AST SERPL-CCNC: 31 U/L (ref 15–37)
BACTERIA URNS QL MICRO: ABNORMAL /HPF
BASOPHILS # BLD: 0.05 K/UL (ref 0–0.2)
BASOPHILS NFR BLD: 0.8 % (ref 0–2)
BILIRUB SERPL-MCNC: 0.4 MG/DL (ref 0–1.2)
BILIRUB UR QL: NEGATIVE
BUN SERPL-MCNC: 26 MG/DL (ref 8–23)
CALCIUM SERPL-MCNC: 10 MG/DL (ref 8.8–10.2)
CHLORIDE SERPL-SCNC: 103 MMOL/L (ref 98–107)
CHOLEST SERPL-MCNC: 188 MG/DL (ref 0–200)
CO2 SERPL-SCNC: 28 MMOL/L (ref 20–29)
COLOR UR: ABNORMAL
CREAT SERPL-MCNC: 1.09 MG/DL (ref 0.6–1.1)
DIFFERENTIAL METHOD BLD: ABNORMAL
EOSINOPHIL # BLD: 0.16 K/UL (ref 0–0.8)
EOSINOPHIL NFR BLD: 2.5 % (ref 0.5–7.8)
EPI CELLS #/AREA URNS HPF: ABNORMAL /HPF (ref 0–5)
ERYTHROCYTE [DISTWIDTH] IN BLOOD BY AUTOMATED COUNT: 12.2 % (ref 11.9–14.6)
GLOBULIN SER CALC-MCNC: 3 G/DL (ref 2.3–3.5)
GLUCOSE SERPL-MCNC: 100 MG/DL (ref 70–99)
GLUCOSE UR STRIP.AUTO-MCNC: NEGATIVE MG/DL
HCT VFR BLD AUTO: 36.3 % (ref 35.8–46.3)
HDLC SERPL-MCNC: 62 MG/DL (ref 40–60)
HDLC SERPL: 3 (ref 0–5)
HGB BLD-MCNC: 12 G/DL (ref 11.7–15.4)
HGB UR QL STRIP: NEGATIVE
HYALINE CASTS URNS QL MICRO: ABNORMAL /LPF
IMM GRANULOCYTES # BLD AUTO: 0.01 K/UL (ref 0–0.5)
IMM GRANULOCYTES NFR BLD AUTO: 0.2 % (ref 0–5)
KETONES UR QL STRIP.AUTO: NEGATIVE MG/DL
LDLC SERPL CALC-MCNC: 106 MG/DL (ref 0–100)
LEUKOCYTE ESTERASE UR QL STRIP.AUTO: ABNORMAL
LYMPHOCYTES # BLD: 1.67 K/UL (ref 0.5–4.6)
LYMPHOCYTES NFR BLD: 25.6 % (ref 13–44)
MCH RBC QN AUTO: 29.9 PG (ref 26.1–32.9)
MCHC RBC AUTO-ENTMCNC: 33.1 G/DL (ref 31.4–35)
MCV RBC AUTO: 90.5 FL (ref 82–102)
MONOCYTES # BLD: 0.4 K/UL (ref 0.1–1.3)
MONOCYTES NFR BLD: 6.1 % (ref 4–12)
NEUTS SEG # BLD: 4.23 K/UL (ref 1.7–8.2)
NEUTS SEG NFR BLD: 64.8 % (ref 43–78)
NITRITE UR QL STRIP.AUTO: NEGATIVE
NRBC # BLD: 0 K/UL (ref 0–0.2)
PH UR STRIP: 5.5 (ref 5–9)
PLATELET # BLD AUTO: 345 K/UL (ref 150–450)
PMV BLD AUTO: 10 FL (ref 9.4–12.3)
POTASSIUM SERPL-SCNC: 4.4 MMOL/L (ref 3.5–5.1)
PROT SERPL-MCNC: 7.1 G/DL (ref 6.3–8.2)
PROT UR STRIP-MCNC: NEGATIVE MG/DL
RBC # BLD AUTO: 4.01 M/UL (ref 4.05–5.2)
RBC #/AREA URNS HPF: ABNORMAL /HPF (ref 0–5)
SODIUM SERPL-SCNC: 142 MMOL/L (ref 136–145)
SP GR UR REFRACTOMETRY: 1.01 (ref 1–1.02)
T4 FREE SERPL-MCNC: 1.6 NG/DL (ref 0.9–1.7)
TRIGL SERPL-MCNC: 99 MG/DL (ref 0–150)
TSH W FREE THYROID IF ABNORMAL: 7.81 UIU/ML (ref 0.27–4.2)
UROBILINOGEN UR QL STRIP.AUTO: 0.2 EU/DL (ref 0.2–1)
VLDLC SERPL CALC-MCNC: 20 MG/DL (ref 6–23)
WBC # BLD AUTO: 6.5 K/UL (ref 4.3–11.1)
WBC URNS QL MICRO: ABNORMAL /HPF (ref 0–4)

## 2025-02-04 PROCEDURE — 1123F ACP DISCUSS/DSCN MKR DOCD: CPT | Performed by: INTERNAL MEDICINE

## 2025-02-04 PROCEDURE — 3078F DIAST BP <80 MM HG: CPT | Performed by: INTERNAL MEDICINE

## 2025-02-04 PROCEDURE — 1160F RVW MEDS BY RX/DR IN RCRD: CPT | Performed by: INTERNAL MEDICINE

## 2025-02-04 PROCEDURE — 99214 OFFICE O/P EST MOD 30 MIN: CPT | Performed by: INTERNAL MEDICINE

## 2025-02-04 PROCEDURE — 3077F SYST BP >= 140 MM HG: CPT | Performed by: INTERNAL MEDICINE

## 2025-02-04 PROCEDURE — G8417 CALC BMI ABV UP PARAM F/U: HCPCS | Performed by: INTERNAL MEDICINE

## 2025-02-04 PROCEDURE — 1090F PRES/ABSN URINE INCON ASSESS: CPT | Performed by: INTERNAL MEDICINE

## 2025-02-04 PROCEDURE — 3023F SPIROM DOC REV: CPT | Performed by: INTERNAL MEDICINE

## 2025-02-04 PROCEDURE — 1159F MED LIST DOCD IN RCRD: CPT | Performed by: INTERNAL MEDICINE

## 2025-02-04 PROCEDURE — G8427 DOCREV CUR MEDS BY ELIG CLIN: HCPCS | Performed by: INTERNAL MEDICINE

## 2025-02-04 PROCEDURE — G8399 PT W/DXA RESULTS DOCUMENT: HCPCS | Performed by: INTERNAL MEDICINE

## 2025-02-04 PROCEDURE — 1036F TOBACCO NON-USER: CPT | Performed by: INTERNAL MEDICINE

## 2025-02-04 RX ORDER — LEVOTHYROXINE SODIUM 88 UG/1
TABLET ORAL
Qty: 90 TABLET | Refills: 3 | Status: SHIPPED | OUTPATIENT
Start: 2025-02-04 | End: 2025-02-05 | Stop reason: SDUPTHER

## 2025-02-04 RX ORDER — LEVOTHYROXINE SODIUM 75 UG/1
TABLET ORAL
Qty: 90 TABLET | Refills: 3 | Status: SHIPPED | OUTPATIENT
Start: 2025-02-04 | End: 2025-02-05 | Stop reason: SDUPTHER

## 2025-02-04 SDOH — ECONOMIC STABILITY: FOOD INSECURITY: WITHIN THE PAST 12 MONTHS, THE FOOD YOU BOUGHT JUST DIDN'T LAST AND YOU DIDN'T HAVE MONEY TO GET MORE.: NEVER TRUE

## 2025-02-04 SDOH — ECONOMIC STABILITY: FOOD INSECURITY: WITHIN THE PAST 12 MONTHS, YOU WORRIED THAT YOUR FOOD WOULD RUN OUT BEFORE YOU GOT MONEY TO BUY MORE.: NEVER TRUE

## 2025-02-04 ASSESSMENT — PATIENT HEALTH QUESTIONNAIRE - PHQ9
SUM OF ALL RESPONSES TO PHQ QUESTIONS 1-9: 1
4. FEELING TIRED OR HAVING LITTLE ENERGY: NOT AT ALL
SUM OF ALL RESPONSES TO PHQ QUESTIONS 1-9: 1
SUM OF ALL RESPONSES TO PHQ QUESTIONS 1-9: 1
7. TROUBLE CONCENTRATING ON THINGS, SUCH AS READING THE NEWSPAPER OR WATCHING TELEVISION: SEVERAL DAYS
8. MOVING OR SPEAKING SO SLOWLY THAT OTHER PEOPLE COULD HAVE NOTICED. OR THE OPPOSITE, BEING SO FIGETY OR RESTLESS THAT YOU HAVE BEEN MOVING AROUND A LOT MORE THAN USUAL: NOT AT ALL
1. LITTLE INTEREST OR PLEASURE IN DOING THINGS: NOT AT ALL
SUM OF ALL RESPONSES TO PHQ9 QUESTIONS 1 & 2: 0
2. FEELING DOWN, DEPRESSED OR HOPELESS: NOT AT ALL
9. THOUGHTS THAT YOU WOULD BE BETTER OFF DEAD, OR OF HURTING YOURSELF: NOT AT ALL
5. POOR APPETITE OR OVEREATING: NOT AT ALL
3. TROUBLE FALLING OR STAYING ASLEEP: NOT AT ALL
10. IF YOU CHECKED OFF ANY PROBLEMS, HOW DIFFICULT HAVE THESE PROBLEMS MADE IT FOR YOU TO DO YOUR WORK, TAKE CARE OF THINGS AT HOME, OR GET ALONG WITH OTHER PEOPLE: NOT DIFFICULT AT ALL
6. FEELING BAD ABOUT YOURSELF - OR THAT YOU ARE A FAILURE OR HAVE LET YOURSELF OR YOUR FAMILY DOWN: NOT AT ALL
SUM OF ALL RESPONSES TO PHQ QUESTIONS 1-9: 1

## 2025-02-04 ASSESSMENT — ENCOUNTER SYMPTOMS
WHEEZING: 0
DIARRHEA: 0
CONSTIPATION: 0
NAUSEA: 0
SHORTNESS OF BREATH: 0
COUGH: 0
VOMITING: 0
BLOOD IN STOOL: 0

## 2025-02-04 NOTE — PROGRESS NOTES
2/4/2025 10:56 AM  Location:[facility] PHYSICIAN SERVICES  [facility] INTERNAL MEDICINE  [place]  Patient #:  [id number]  YOB: 1940            History of Present Illness     Chief Complaint   Patient presents with    6 Month Follow-Up     6 month f/u    Leg Pain     Complains with leg cramping at night     Breast Mass     Has a spot on her left breast she would like checked. This area is tender to the touch.        Ms. Mcwilliams is a 84 y.o. female  who presents for the above.   Notes that she is not checking her BP at home that often.  BP yesterday was 120.      Leg Pain   Pertinent negatives include no numbness.          Allergies   Allergen Reactions    Sulfa Antibiotics Nausea Only and Other (See Comments)     dizzy     Past Medical History:   Diagnosis Date    Actinic keratosis 7/24/2015    Arthritis     Benign neoplasm of colon 7/24/2015    Closed fracture of nasal bone 6/21/2017    Last Assessment & Plan:  Formatting of this note might be different from the original. Patient just needs to be careful about hitting her nose over the next week to 10 days. If there are questions or concerns then she will call.    Cough     COVID 09/2021    Diverticulitis of colon (without mention of hemorrhage)(562.11) 7/24/2015    Diverticulosis of colon (without mention of hemorrhage) 7/24/2015    Encounter for long-term (current) use of other medications 7/24/2015    Essential hypertension, benign 7/24/2015    on med for control     Former smoker     GERD (gastroesophageal reflux disease)     prn med     Nausea & vomiting     post-op N/V    Postmenopausal atrophic vaginitis 7/29/2015    PUD (peptic ulcer disease)     hx; no current problems     Pure hypercholesterolemia 07/24/2015    on med    Thyroid nodule      Social History     Socioeconomic History    Marital status:      Spouse name: None    Number of children: None    Years of education: None    Highest education level: None   Tobacco Use    Smoking

## 2025-02-05 ENCOUNTER — TELEPHONE (OUTPATIENT)
Dept: INTERNAL MEDICINE CLINIC | Facility: CLINIC | Age: 85
End: 2025-02-05

## 2025-02-05 DIAGNOSIS — E89.0 POSTSURGICAL HYPOTHYROIDISM: Primary | ICD-10-CM

## 2025-02-05 RX ORDER — LEVOTHYROXINE SODIUM 88 UG/1
TABLET ORAL
Qty: 90 TABLET | Refills: 3 | Status: SHIPPED
Start: 2025-02-05

## 2025-02-05 RX ORDER — LEVOTHYROXINE SODIUM 75 UG/1
TABLET ORAL
Qty: 90 TABLET | Refills: 3 | Status: SHIPPED
Start: 2025-02-05

## 2025-02-05 NOTE — TELEPHONE ENCOUNTER
Patient called in again this afternoon and she is not sure which medication dosage she should be taking and would like a call back today she has to take medication in the morning

## 2025-02-05 NOTE — TELEPHONE ENCOUNTER
Ms. Mcwilliams has called and wants a call back about her labs and her medications. This is about her thyroid and the dosage of her medication.

## 2025-02-05 NOTE — TELEPHONE ENCOUNTER
Take the levothyroxine 88 mcg five days a week (all days except for Monday and Friday) and 75 mcg two days a week on Monday and Friday.  We need to repeat your TSH and free T4 in 6 weeks.  Other labs are stable.   Otherwise, continue your current doses of medications.  Sorry for the inconvenience.  Thanks.  [facility]

## 2025-02-05 NOTE — TELEPHONE ENCOUNTER
Pt called regarding her thyroid medication and her lab work. She wanted to verify if her thyroid dose needed to be changed due to her recent lab work .   Pt wanted lab results.

## 2025-02-12 DIAGNOSIS — Z78.0 MENOPAUSE: ICD-10-CM

## 2025-02-12 DIAGNOSIS — Z13.820 SCREENING FOR OSTEOPOROSIS: ICD-10-CM

## 2025-02-12 RX ORDER — HYDROCHLOROTHIAZIDE 12.5 MG/1
12.5 TABLET ORAL DAILY
Qty: 90 TABLET | Refills: 3 | Status: SHIPPED | OUTPATIENT
Start: 2025-02-12

## 2025-02-13 ENCOUNTER — TELEPHONE (OUTPATIENT)
Dept: INTERNAL MEDICINE CLINIC | Facility: CLINIC | Age: 85
End: 2025-02-13

## 2025-02-13 NOTE — TELEPHONE ENCOUNTER
----- Message from Dr. Dawna Smalls MD sent at 2/12/2025  7:59 AM EST -----    Labs are stable except for thyroid which was addressed in previous note.  Continue your current doses of medications. Keep up the good work.  Thanks.  [facility]

## 2025-02-13 NOTE — TELEPHONE ENCOUNTER
----- Message from Dr. Dawna Smalls MD sent at 2/12/2025  7:59 AM EST -----    Labs are stable except for thyroid which was addressed in previous note.  Continue your current doses of medications. Keep up the good work.  Thanks.  [facility]

## 2025-03-12 DIAGNOSIS — E89.0 POSTSURGICAL HYPOTHYROIDISM: ICD-10-CM

## 2025-03-12 LAB
T4 FREE SERPL-MCNC: 1.5 NG/DL (ref 0.9–1.7)
TSH W FREE THYROID IF ABNORMAL: 12.1 UIU/ML (ref 0.27–4.2)

## 2025-03-13 ENCOUNTER — RESULTS FOLLOW-UP (OUTPATIENT)
Dept: INTERNAL MEDICINE CLINIC | Facility: CLINIC | Age: 85
End: 2025-03-13

## 2025-03-13 DIAGNOSIS — E89.0 POSTSURGICAL HYPOTHYROIDISM: ICD-10-CM

## 2025-03-13 RX ORDER — LEVOTHYROXINE SODIUM 88 UG/1
88 TABLET ORAL DAILY
Qty: 90 TABLET | Refills: 3 | Status: SHIPPED
Start: 2025-03-13 | End: 2025-03-13

## 2025-03-13 RX ORDER — LEVOTHYROXINE SODIUM 88 UG/1
88 TABLET ORAL DAILY
Qty: 90 TABLET | Refills: 3 | Status: SHIPPED
Start: 2025-03-13

## 2025-03-17 DIAGNOSIS — E89.0 POSTSURGICAL HYPOTHYROIDISM: ICD-10-CM

## 2025-03-17 RX ORDER — LOSARTAN POTASSIUM 50 MG/1
50 TABLET ORAL DAILY
Qty: 90 TABLET | Refills: 3 | Status: SHIPPED | OUTPATIENT
Start: 2025-03-17

## 2025-03-17 RX ORDER — LEVOTHYROXINE SODIUM 88 UG/1
88 TABLET ORAL DAILY
Qty: 90 TABLET | Refills: 3 | Status: CANCELLED | OUTPATIENT
Start: 2025-03-17

## 2025-03-28 RX ORDER — LOVASTATIN 20 MG/1
20 TABLET ORAL NIGHTLY
Qty: 90 TABLET | Refills: 3 | Status: SHIPPED | OUTPATIENT
Start: 2025-03-28

## 2025-04-07 ENCOUNTER — TELEPHONE (OUTPATIENT)
Dept: ORTHOPEDIC SURGERY | Age: 85
End: 2025-04-07

## 2025-04-07 DIAGNOSIS — E89.0 POSTSURGICAL HYPOTHYROIDISM: ICD-10-CM

## 2025-04-07 RX ORDER — LEVOTHYROXINE SODIUM 88 UG/1
88 TABLET ORAL DAILY
Qty: 90 TABLET | Refills: 3 | Status: SHIPPED | OUTPATIENT
Start: 2025-04-07

## 2025-04-07 RX ORDER — OMEPRAZOLE 20 MG/1
20 CAPSULE, DELAYED RELEASE ORAL DAILY
Qty: 90 CAPSULE | Refills: 3 | Status: SHIPPED | OUTPATIENT
Start: 2025-04-07

## 2025-04-07 NOTE — TELEPHONE ENCOUNTER
Medication Refill Request      Name of Medication : levothyroxine (SYNTHROID)       Strength of Medication: 88 MCG tablet       Directions: Take 1 tablet by mouth Daily, Disp-90 tablet       30 day or 90 day supply: 90      Preferred Pharmacy: [id number] - [place], [place] - [address] - P [phone] -  [phone]      Additional Information For Provider: Just had it refilled it 16 days ago and now has only 2 pills left. States it is too soon for a refill based on how she was taking it.    Medication Refill Request      Name of Medication : omeprazole (PRILOSEC)       Strength of Medication: 20 MG delayed release capsule       Directions: Take 1 capsule by mouth daily, Disp-90 capsule       30 day or 90 day supply: 90      Preferred Pharmacy: [facility] - [place], [place] - [address] - P [phone] -  [phone]      Additional Information For Provider:

## 2025-04-07 NOTE — TELEPHONE ENCOUNTER
Pt wants a 2nd opinion on her left knee. She needs sx and req JEJ. Her records from [facility] are in her chart. Will he see her?

## 2025-04-14 ENCOUNTER — OFFICE VISIT (OUTPATIENT)
Dept: PULMONOLOGY | Age: 85
End: 2025-04-14
Payer: MEDICARE

## 2025-04-14 VITALS
HEIGHT: 63 IN | OXYGEN SATURATION: 99 % | DIASTOLIC BLOOD PRESSURE: 74 MMHG | TEMPERATURE: 97.3 F | BODY MASS INDEX: 25.87 KG/M2 | SYSTOLIC BLOOD PRESSURE: 132 MMHG | WEIGHT: 146 LBS | HEART RATE: 79 BPM | RESPIRATION RATE: 18 BRPM

## 2025-04-14 DIAGNOSIS — K21.9 GASTROESOPHAGEAL REFLUX DISEASE WITHOUT ESOPHAGITIS: ICD-10-CM

## 2025-04-14 DIAGNOSIS — R91.8 LUNG NODULES: ICD-10-CM

## 2025-04-14 DIAGNOSIS — J44.89 COPD WITH ASTHMA (HCC): Primary | ICD-10-CM

## 2025-04-14 DIAGNOSIS — Z87.891 PERSONAL HISTORY OF TOBACCO USE: ICD-10-CM

## 2025-04-14 DIAGNOSIS — M25.562 LEFT KNEE PAIN, UNSPECIFIED CHRONICITY: ICD-10-CM

## 2025-04-14 LAB
EXPIRATORY TIME: NORMAL
FEF 25-75% %PRED-PRE: NORMAL
FEF 25-75% PRED: NORMAL
FEF 25-75-PRE: NORMAL
FEV1 %PRED-PRE: 72 %
FEV1 PRED: 1.79 L
FEV1/FVC %PRED-PRE: NORMAL
FEV1/FVC PRED: NORMAL
FEV1/FVC: 66 %
FEV1: 1.29 L
FVC %PRED-PRE: 82 %
FVC PRED: 2.37 L
FVC: 1.94 L
PEF %PRED-PRE: NORMAL
PEF PRED: NORMAL
PEF-PRE: NORMAL

## 2025-04-14 PROCEDURE — 99214 OFFICE O/P EST MOD 30 MIN: CPT | Performed by: NURSE PRACTITIONER

## 2025-04-14 PROCEDURE — 1159F MED LIST DOCD IN RCRD: CPT | Performed by: NURSE PRACTITIONER

## 2025-04-14 PROCEDURE — 94010 BREATHING CAPACITY TEST: CPT | Performed by: INTERNAL MEDICINE

## 2025-04-14 PROCEDURE — 3075F SYST BP GE 130 - 139MM HG: CPT | Performed by: NURSE PRACTITIONER

## 2025-04-14 PROCEDURE — G8417 CALC BMI ABV UP PARAM F/U: HCPCS | Performed by: NURSE PRACTITIONER

## 2025-04-14 PROCEDURE — 3078F DIAST BP <80 MM HG: CPT | Performed by: NURSE PRACTITIONER

## 2025-04-14 PROCEDURE — 1090F PRES/ABSN URINE INCON ASSESS: CPT | Performed by: NURSE PRACTITIONER

## 2025-04-14 PROCEDURE — 1036F TOBACCO NON-USER: CPT | Performed by: NURSE PRACTITIONER

## 2025-04-14 PROCEDURE — 1123F ACP DISCUSS/DSCN MKR DOCD: CPT | Performed by: NURSE PRACTITIONER

## 2025-04-14 PROCEDURE — G8399 PT W/DXA RESULTS DOCUMENT: HCPCS | Performed by: NURSE PRACTITIONER

## 2025-04-14 PROCEDURE — 1126F AMNT PAIN NOTED NONE PRSNT: CPT | Performed by: NURSE PRACTITIONER

## 2025-04-14 PROCEDURE — G8427 DOCREV CUR MEDS BY ELIG CLIN: HCPCS | Performed by: NURSE PRACTITIONER

## 2025-04-14 PROCEDURE — 3023F SPIROM DOC REV: CPT | Performed by: NURSE PRACTITIONER

## 2025-04-14 PROCEDURE — 1160F RVW MEDS BY RX/DR IN RCRD: CPT | Performed by: NURSE PRACTITIONER

## 2025-04-14 ASSESSMENT — PULMONARY FUNCTION TESTS
FEV1_PERCENT_PREDICTED_PRE: 72
FEV1_PREDICTED: 1.79
FVC_PREDICTED: 2.37
FEV1/FVC: 66
FVC_PERCENT_PREDICTED_PRE: 82
FVC: 1.94
FEV1: 1.29

## 2025-04-14 NOTE — PROGRESS NOTES
Name:  Renita Mcwilliams  YOB: 1940   MRN: [id number]      Office Visit: 4/14/2025      The patient (or guardian, if applicable) and other individuals in attendance with the patient were advised that Artificial Intelligence will be utilized during this visit to record, process the conversation to generate a clinical note, and support improvement of the AI technology. The patient (or guardian, if applicable) and other individuals in attendance at the appointment consented to the use of AI, including the recording.         Assessment & Plan (Medical Decision Making)      Impression: 85 y.o. female     Assessment & Plan  1. 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  Her spirometry results today indicate mild obstruction, suggesting that her condition is well-managed. She reports using her albuterol inhaler a couple of times a month, and more frequently during pollen season. She is advised to continue using albuterol as needed. If she experiences a cold or increased wheezing, she should contact the office for a potential earlier appointment or phone consultation.  - Spirometry Without Bronchodilator    2. Lung Nodules.  She has known small nodules up to 6 mm in the right upper lobe, right lower lobe, and left lower lobe, as well as a calcified granuloma on the right. A CT scan will be ordered to monitor the stability of these nodules. Radiology will contact her within the next week to schedule the scan. If she does not hear from them, she has been provided with their contact information to arrange the appointment herself. The results of the CT scan will be communicated to her upon receipt.    3.  Reflux  Controlled on omeprazole.  Continue current therapy    4.  Personal history of tobacco use  40 pack years, quitting in 2006.  Does not qualify for LDCT    5.  Knee pain  She is contemplating knee surgery in the near future.  She is considered low risk for keily-/post-operative pulmonary

## 2025-04-14 NOTE — PATIENT INSTRUCTIONS
I have ordered CT of chest.  You should receive a call from scheduling within the next week.  If you do not hear from them, please call [phone] to schedule your test.

## 2025-04-17 ENCOUNTER — OFFICE VISIT (OUTPATIENT)
Dept: ORTHOPEDIC SURGERY | Age: 85
End: 2025-04-17
Payer: MEDICARE

## 2025-04-17 DIAGNOSIS — M17.12 PRIMARY OSTEOARTHRITIS OF LEFT KNEE: ICD-10-CM

## 2025-04-17 DIAGNOSIS — M25.562 LEFT KNEE PAIN, UNSPECIFIED CHRONICITY: Primary | ICD-10-CM

## 2025-04-17 PROCEDURE — 20610 DRAIN/INJ JOINT/BURSA W/O US: CPT | Performed by: ORTHOPAEDIC SURGERY

## 2025-04-17 PROCEDURE — 1036F TOBACCO NON-USER: CPT | Performed by: ORTHOPAEDIC SURGERY

## 2025-04-17 PROCEDURE — G8428 CUR MEDS NOT DOCUMENT: HCPCS | Performed by: ORTHOPAEDIC SURGERY

## 2025-04-17 PROCEDURE — 1090F PRES/ABSN URINE INCON ASSESS: CPT | Performed by: ORTHOPAEDIC SURGERY

## 2025-04-17 PROCEDURE — G8399 PT W/DXA RESULTS DOCUMENT: HCPCS | Performed by: ORTHOPAEDIC SURGERY

## 2025-04-17 PROCEDURE — G8417 CALC BMI ABV UP PARAM F/U: HCPCS | Performed by: ORTHOPAEDIC SURGERY

## 2025-04-17 PROCEDURE — 1123F ACP DISCUSS/DSCN MKR DOCD: CPT | Performed by: ORTHOPAEDIC SURGERY

## 2025-04-17 PROCEDURE — 99205 OFFICE O/P NEW HI 60 MIN: CPT | Performed by: ORTHOPAEDIC SURGERY

## 2025-04-17 RX ORDER — METHYLPREDNISOLONE ACETATE 40 MG/ML
80 INJECTION, SUSPENSION INTRA-ARTICULAR; INTRALESIONAL; INTRAMUSCULAR; SOFT TISSUE ONCE
Status: COMPLETED | OUTPATIENT
Start: 2025-04-17 | End: 2025-04-17

## 2025-04-17 RX ORDER — MELOXICAM 7.5 MG/1
7.5 TABLET ORAL 2 TIMES DAILY
Qty: 30 TABLET | Refills: 3 | Status: SHIPPED | OUTPATIENT
Start: 2025-04-17

## 2025-04-17 RX ADMIN — METHYLPREDNISOLONE ACETATE 80 MG: 40 INJECTION, SUSPENSION INTRA-ARTICULAR; INTRALESIONAL; INTRAMUSCULAR; SOFT TISSUE at 15:57

## 2025-04-17 NOTE — PROGRESS NOTES
the needle was left in place a total of 2cc of 40mg methylprednisolone and 5 cc of Lidocaine was slowly injected. The patient tolerated the procedure well. The injection area was cleaned and Band-Aid applied. No excessive bleeding was noted. Patient dressed and was discharged to home with instructions.    Discussion:  The patient tolerated the procedure well.    DEEPAK WARREN JR, MD  April 17, 2025    RECOMMENDATIONS:    Reviewed x-ray findings with the patient.   This patient's clinical history and physical exam is consistent with osteoarthritis of the left knee(s). We discussed the natural history of this condition as a degenerative process that causes inflammation of the joints due to a breakdown of cartilage, the hard, thick tissue that cushions the joints. We discussed that osteoarthritis never completely resolves on its own and symptoms including pain, stiffness, and swelling may wax and wane as the condition progresses. She understands that conservative treatments typically include activity modification, NSAIDs and physical therapy.  Oral and/or steroid injections into the joint could be considered in resistant scenarios. Viscosupplementation injections may also be useful as a temporary cartilage replacement in certain patients. I advised to avoid any prolonged bedrest and to try to maintain ADLs as much as possible. The patient was counseled to follow up with me should she develop any worsening symptoms that begin to limit activities of daily living.     ----The patient will be treated observantly with self directed symptomatic care. Instructions were given to follow up if there is any neurologic or functional decline.  ----The patient tolerated cortisone injection well today.  ---- A home exercise program was prescribed for stretching and strengthening. A list of exercises was provided.   ---- Physical Therapy was prescribed and will include stretching, strengthening and modalities to promote blood flow,

## 2025-04-21 ENCOUNTER — HOSPITAL ENCOUNTER (OUTPATIENT)
Dept: CT IMAGING | Age: 85
Discharge: HOME | End: 2025-04-23
Payer: MEDICARE

## 2025-04-21 DIAGNOSIS — R91.8 LUNG NODULES: ICD-10-CM

## 2025-04-21 PROCEDURE — 71250 CT THORAX DX C-: CPT

## 2025-04-22 ENCOUNTER — RESULTS FOLLOW-UP (OUTPATIENT)
Dept: PULMONOLOGY | Age: 85
End: 2025-04-22

## 2025-04-22 DIAGNOSIS — R91.8 LUNG NODULES: Primary | ICD-10-CM

## 2025-04-23 NOTE — TELEPHONE ENCOUNTER
----- Message from GILLIAN Aguillon CNP sent at 4/22/2025 10:33 PM EDT -----  Please let patient know that nodules are stable.  Largest is 7 mm.  Recommend follow up CT in 3-6 months.

## 2025-04-24 DIAGNOSIS — E89.0 POSTSURGICAL HYPOTHYROIDISM: ICD-10-CM

## 2025-04-24 LAB
T4 FREE SERPL-MCNC: 1.5 NG/DL (ref 0.9–1.7)
TSH W FREE THYROID IF ABNORMAL: 5.42 UIU/ML (ref 0.27–4.2)

## 2025-04-25 ENCOUNTER — RESULTS FOLLOW-UP (OUTPATIENT)
Dept: INTERNAL MEDICINE CLINIC | Facility: CLINIC | Age: 85
End: 2025-04-25

## 2025-04-25 NOTE — RESULT ENCOUNTER NOTE
Labs have have improved.  Continue your current doses of medications. Keep up the good work.  Hope you are feeling well.  Thanks.  [facility]

## 2025-06-06 ENCOUNTER — CARE COORDINATION (OUTPATIENT)
Dept: CARE COORDINATION | Facility: CLINIC | Age: 85
End: 2025-06-06

## 2025-06-06 ENCOUNTER — TELEPHONE (OUTPATIENT)
Dept: PULMONOLOGY | Age: 85
End: 2025-06-06

## 2025-06-06 NOTE — CARE COORDINATION
Care Transitions Note    Initial Call - Call within 2 business days of discharge: Yes    Patient Current Location:  Home: [address] [place] [id number]    Care Transition Nurse contacted the patient by telephone to perform post hospital discharge assessment, verified name and  as identifiers.  Provided introduction to self, and explanation of the Care Transition Nurse role.    Patient: Renita Mcwilliams    Patient : 1940   MRN: [id number]    Reason for Admission: dyspnea  Discharge Date:   2025 RURS: No data recorded    Was this an external facility discharge? Yes. Discharge Date: 2025. [facility]    Additional needs identified to be addressed with provider   No needs identified             Method of communication with provider: none.    Patients top risk factors for readmission: medical condition-COPD,HTN    Interventions to address risk factors:   Patient reports having an episode of wheezing and SOB this am. She has contacted [facility] ans is waiting on a return call.  Patient reports at time of call she is feeling better with her O2 ranging between 91%-94%, HR-84, and temp-98.2. She took her BP earlier this am and doesn't have the reading available at time of call, but reports it was within normal limits.  Reviewed discharge instructions and offered opportunity to ask any questions regarding discharge instructions.  Confirmed medications obtained and taking as ordered:metoprolol 25 mg tablet  Take 1 tablet (25 mg) by mouth 2 (two) times a day  Reviewed upcoming follow up appointments:  PCP HFU scheduled-2025 at 11:20 am  Patient to contact CTN with any issues, questions, or concerns prior to next outreach.      Care Transition Nurse reviewed discharge instructions, medical action plan, and red flags with patient. The patient was given an opportunity to ask questions; all questions answered at this time.. The patient verbalized understanding.   Were

## 2025-06-06 NOTE — TELEPHONE ENCOUNTER
TRIAGE CALL      Complaint: SOB  Cough: yes  Productive:  yes  Bloody Sputum:  no  Increased SOB/Wheezing:  yes  Duration: 1 weeks  Fever/Chills: yes  OTC Meds tried: none  Was at [facility] Wednesday

## 2025-06-06 NOTE — TELEPHONE ENCOUNTER
Last seen: 4/14/25  Hx: COPD, nodules, reflux, knee pain    Patient call reporting increased sob w/ productive cough; some sob & wheezing, fever/chills, symptoms for a week, was at [facility] Wednesday.  Was seen at PCP/urgent care 6/4 for sob, duoneb admin and summoned EMS to transport to ER. Small run of VT in ER and admitted for echo and observation, started beta blocker; at d/c noted no COPD exacerbation advised to f/u @ pulmonary for PFTs.    Contacted patient and scheduled next open appt as f/u post ER/hospitalization.

## 2025-06-09 ENCOUNTER — OFFICE VISIT (OUTPATIENT)
Dept: INTERNAL MEDICINE CLINIC | Facility: CLINIC | Age: 85
End: 2025-06-09
Payer: MEDICARE

## 2025-06-09 ENCOUNTER — TELEPHONE (OUTPATIENT)
Dept: INTERNAL MEDICINE CLINIC | Facility: CLINIC | Age: 85
End: 2025-06-09

## 2025-06-09 VITALS
WEIGHT: 144.8 LBS | HEART RATE: 90 BPM | RESPIRATION RATE: 16 BRPM | SYSTOLIC BLOOD PRESSURE: 109 MMHG | HEIGHT: 63 IN | OXYGEN SATURATION: 96 % | DIASTOLIC BLOOD PRESSURE: 50 MMHG | TEMPERATURE: 97.7 F | BODY MASS INDEX: 25.66 KG/M2

## 2025-06-09 DIAGNOSIS — N17.9 AKI (ACUTE KIDNEY INJURY): ICD-10-CM

## 2025-06-09 DIAGNOSIS — I49.9 CARDIAC ARRHYTHMIA, UNSPECIFIED CARDIAC ARRHYTHMIA TYPE: ICD-10-CM

## 2025-06-09 DIAGNOSIS — J44.89 COPD WITH ASTHMA (HCC): ICD-10-CM

## 2025-06-09 DIAGNOSIS — R06.02 SHORTNESS OF BREATH: Primary | ICD-10-CM

## 2025-06-09 LAB
ANION GAP SERPL CALC-SCNC: 14 MMOL/L (ref 7–16)
BASOPHILS # BLD: 0.06 K/UL (ref 0–0.2)
BASOPHILS NFR BLD: 0.7 % (ref 0–2)
BUN SERPL-MCNC: 32 MG/DL (ref 8–23)
CALCIUM SERPL-MCNC: 9.5 MG/DL (ref 8.8–10.2)
CHLORIDE SERPL-SCNC: 94 MMOL/L (ref 98–107)
CO2 SERPL-SCNC: 24 MMOL/L (ref 20–29)
CREAT SERPL-MCNC: 1.47 MG/DL (ref 0.6–1.1)
D DIMER PPP FEU-MCNC: 4.35 UG/ML(FEU)
DIFFERENTIAL METHOD BLD: ABNORMAL
EOSINOPHIL # BLD: 1.23 K/UL (ref 0–0.8)
EOSINOPHIL NFR BLD: 14.6 % (ref 0.5–7.8)
ERYTHROCYTE [DISTWIDTH] IN BLOOD BY AUTOMATED COUNT: 11.9 % (ref 11.9–14.6)
GLUCOSE SERPL-MCNC: 123 MG/DL (ref 70–99)
HCT VFR BLD AUTO: 32.5 % (ref 35.8–46.3)
HGB BLD-MCNC: 10.9 G/DL (ref 11.7–15.4)
IMM GRANULOCYTES # BLD AUTO: 0.12 K/UL (ref 0–0.5)
IMM GRANULOCYTES NFR BLD AUTO: 1.4 % (ref 0–5)
LYMPHOCYTES # BLD: 0.91 K/UL (ref 0.5–4.6)
LYMPHOCYTES NFR BLD: 10.8 % (ref 13–44)
MAGNESIUM SERPL-MCNC: 2 MG/DL (ref 1.8–2.4)
MCH RBC QN AUTO: 30.6 PG (ref 26.1–32.9)
MCHC RBC AUTO-ENTMCNC: 33.5 G/DL (ref 31.4–35)
MCV RBC AUTO: 91.3 FL (ref 82–102)
MONOCYTES # BLD: 0.49 K/UL (ref 0.1–1.3)
MONOCYTES NFR BLD: 5.8 % (ref 4–12)
NEUTS SEG # BLD: 5.59 K/UL (ref 1.7–8.2)
NEUTS SEG NFR BLD: 66.7 % (ref 43–78)
NRBC # BLD: 0 K/UL (ref 0–0.2)
NT PRO BNP: 385 PG/ML (ref 0–450)
PLATELET # BLD AUTO: 407 K/UL (ref 150–450)
PMV BLD AUTO: 9.7 FL (ref 9.4–12.3)
POTASSIUM SERPL-SCNC: 4.4 MMOL/L (ref 3.5–5.1)
RBC # BLD AUTO: 3.56 M/UL (ref 4.05–5.2)
SODIUM SERPL-SCNC: 132 MMOL/L (ref 136–145)
WBC # BLD AUTO: 8.4 K/UL (ref 4.3–11.1)

## 2025-06-09 PROCEDURE — 1090F PRES/ABSN URINE INCON ASSESS: CPT | Performed by: NURSE PRACTITIONER

## 2025-06-09 PROCEDURE — 1160F RVW MEDS BY RX/DR IN RCRD: CPT | Performed by: NURSE PRACTITIONER

## 2025-06-09 PROCEDURE — G8417 CALC BMI ABV UP PARAM F/U: HCPCS | Performed by: NURSE PRACTITIONER

## 2025-06-09 PROCEDURE — 1123F ACP DISCUSS/DSCN MKR DOCD: CPT | Performed by: NURSE PRACTITIONER

## 2025-06-09 PROCEDURE — 1159F MED LIST DOCD IN RCRD: CPT | Performed by: NURSE PRACTITIONER

## 2025-06-09 PROCEDURE — 3074F SYST BP LT 130 MM HG: CPT | Performed by: NURSE PRACTITIONER

## 2025-06-09 PROCEDURE — 3023F SPIROM DOC REV: CPT | Performed by: NURSE PRACTITIONER

## 2025-06-09 PROCEDURE — 99215 OFFICE O/P EST HI 40 MIN: CPT | Performed by: NURSE PRACTITIONER

## 2025-06-09 PROCEDURE — 3078F DIAST BP <80 MM HG: CPT | Performed by: NURSE PRACTITIONER

## 2025-06-09 PROCEDURE — 1036F TOBACCO NON-USER: CPT | Performed by: NURSE PRACTITIONER

## 2025-06-09 PROCEDURE — 93000 ELECTROCARDIOGRAM COMPLETE: CPT | Performed by: NURSE PRACTITIONER

## 2025-06-09 PROCEDURE — G8399 PT W/DXA RESULTS DOCUMENT: HCPCS | Performed by: NURSE PRACTITIONER

## 2025-06-09 PROCEDURE — G8427 DOCREV CUR MEDS BY ELIG CLIN: HCPCS | Performed by: NURSE PRACTITIONER

## 2025-06-09 RX ORDER — METOPROLOL TARTRATE 25 MG/1
25 TABLET, FILM COATED ORAL 2 TIMES DAILY
COMMUNITY
Start: 2025-06-05 | End: 2025-07-05

## 2025-06-09 ASSESSMENT — ENCOUNTER SYMPTOMS
VOMITING: 0
NAUSEA: 0
SHORTNESS OF BREATH: 1
WHEEZING: 1
COUGH: 1

## 2025-06-09 NOTE — TELEPHONE ENCOUNTER
Spoke with patient, and informed her that [facility] had not sent any orders to Pulmonary, just referrals for cardiology and a holter monitor.  I told patient I did see she had an upcoming appointment with Pulmonary on the 12th, and they could've been calling her as a reminder, but she would need to call their office to find out why they were trying to reach her.

## 2025-06-09 NOTE — PROGRESS NOTES
Differential; Future  - Magnesium; Future  - Extended cardiac holter monitor (3 days - 15 days); Future    4. ELMER (acute kidney injury)    - Basic Metabolic Panel; Future          Total time for encounter on day of encounter was 50 minutes.  This time includes chart prep, review of tests/procedures, review of other provider's notes, documentation and counseling patient regarding disease process and medications.       Maryse Carrasco, APRN - CNP

## 2025-06-09 NOTE — TELEPHONE ENCOUNTER
Pt called and said that she had gotten a call from Pulmonary but when she called them back they didn't know what she was calling for.  She wanted to know if [facility] had called her in orders to the pulmonary doctor, and that is why they were calling.     # [phone]

## 2025-06-10 ENCOUNTER — TELEPHONE (OUTPATIENT)
Dept: INTERNAL MEDICINE CLINIC | Facility: CLINIC | Age: 85
End: 2025-06-10

## 2025-06-10 ENCOUNTER — RESULTS FOLLOW-UP (OUTPATIENT)
Dept: INTERNAL MEDICINE CLINIC | Facility: CLINIC | Age: 85
End: 2025-06-10

## 2025-06-10 ENCOUNTER — CARE COORDINATION (OUTPATIENT)
Dept: CARE COORDINATION | Facility: CLINIC | Age: 85
End: 2025-06-10

## 2025-06-10 DIAGNOSIS — R06.02 SHORTNESS OF BREATH: ICD-10-CM

## 2025-06-10 DIAGNOSIS — J44.89 COPD WITH ASTHMA (HCC): Primary | ICD-10-CM

## 2025-06-10 DIAGNOSIS — R79.89 ELEVATED D-DIMER: Primary | ICD-10-CM

## 2025-06-10 RX ORDER — METHYLPREDNISOLONE 4 MG/1
TABLET ORAL
Qty: 1 KIT | Refills: 0 | Status: SHIPPED | OUTPATIENT
Start: 2025-06-10 | End: 2025-06-16

## 2025-06-10 NOTE — TELEPHONE ENCOUNTER
Talked with patient after ER, she is feeling much better.   Needs 2 weeks follow up with labs with me, thanks!!

## 2025-06-10 NOTE — CARE COORDINATION
Care Transitions Note    Follow Up Call     Patient Current Location:  Home: [address]  [place] [id number]    N Care Coordinator contacted the spouse/partner  by telephone. Verified name and  as identifiers.    Additional needs identified to be addressed with provider   No needs identified                 Method of communication with provider: none.    Care Summary Note: Spoke with patient's spouse who reports patient is currently at [facility].  Per spouse, patient was short of breath and called EMS.  Will follow for discharge disposition.      Advance Care Planning:   Does patient have an Advance Directive: reviewed during previous call, see note. .    Medication Review:  No changes since last call.       Assessments:   Goals Addressed    None          Follow Up Appointment:   Reviewed upcoming appointment(s).  Future Appointments         Provider Specialty Dept Phone    6/10/2025 12:30 PM (Arrive by 12:15 PM) [facility] [phone] 3:40 PM (Arrive by 3:25 PM) Samira Uribe APRN - CNP Pulmonology [phone]    2025 3:00 PM Dawna Smalls MD Internal Medicine [phone]    2026 1:15 PM (Arrive by 1:00 PM) Tammy Qureshi APRTAY - CNP Pulmonology [phone]            LPN Care Coordinator provided contact information.   Follow up in one day  re: ED discharge disposition.      Taylor Greenwood LPN

## 2025-06-11 ENCOUNTER — CARE COORDINATION (OUTPATIENT)
Dept: CARE COORDINATION | Facility: CLINIC | Age: 85
End: 2025-06-11

## 2025-06-11 NOTE — CARE COORDINATION
Medicine [phone]    4/20/2026 1:15 PM (Arrive by 1:00 PM) Tammy Qureshi, APRN - [facility] [phone]            LPN Care Coordinator provided contact information.  Plan for follow-up call in 6-10 days based on severity of symptoms and risk factors.  Plan for next call: symptom management    Taylor Greenwood LPN

## 2025-06-11 NOTE — PROGRESS NOTES
Partner Violence: Unknown (3/20/2021)    [facility]    Intimate Partner Violence     Fear of Current or Ex-Partner: Not asked     Emotionally Abused: Not asked     Physically Abused: Not asked     Sexually Abused: Not asked   Housing Stability: Not At Risk (6/5/2025)    Received from [facility]adequate [facility]     Current Living Situation: I have a steady place to live     Housing Problems: None of the above       Family History   Problem Relation Age of Onset    Heart Disease Mother     Cancer Mother         colon    Stroke Father     Lupus Sister     Liver Disease Sister     Diabetes Sister     Hypertension Sister     Osteoporosis Sister     Other Brother         has one kidney    Heart Disease Brother     Diabetes Brother     Thyroid Cancer Neg Hx     Thyroid Disease Neg Hx        Allergies   Allergen Reactions    Sulfa Antibiotics Nausea Only and Other (See Comments)     dizzy       Current Outpatient Medications   Medication Sig    methylPREDNISolone (MEDROL DOSEPACK) 4 MG tablet Take by mouth.    metoprolol tartrate (LOPRESSOR) 25 MG tablet Take 1 tablet by mouth 2 times daily    meloxicam (MOBIC) 7.5 MG tablet Take 1 tablet by mouth 2 times daily    levothyroxine (SYNTHROID) 88 MCG tablet Take 1 tablet by mouth Daily    omeprazole (PRILOSEC) 20 MG delayed release capsule Take 1 capsule by mouth daily    lovastatin (MEVACOR) 20 MG tablet Take 1 tablet by mouth nightly    losartan (COZAAR) 50 MG tablet Take 1 tablet by mouth daily    hydroCHLOROthiazide 12.5 MG tablet Take 1 tablet by mouth daily    albuterol sulfate HFA (VENTOLIN HFA) 108 (90 Base) MCG/ACT inhaler Inhale 2 puffs into the lungs every 4 hours as needed for Wheezing    azelastine (ASTELIN) 0.1 % nasal spray 1 spray by Nasal route 2 times daily Use in each nostril as directed    BIOTIN PO Take 1 capsule by mouth daily    aspirin 81 MG EC tablet Take 1 tablet by mouth daily    Calcium

## 2025-06-12 ENCOUNTER — OFFICE VISIT (OUTPATIENT)
Dept: PULMONOLOGY | Age: 85
End: 2025-06-12
Payer: MEDICARE

## 2025-06-12 VITALS
SYSTOLIC BLOOD PRESSURE: 108 MMHG | HEIGHT: 62 IN | OXYGEN SATURATION: 95 % | TEMPERATURE: 96.8 F | DIASTOLIC BLOOD PRESSURE: 58 MMHG | HEART RATE: 60 BPM | BODY MASS INDEX: 26.85 KG/M2 | RESPIRATION RATE: 18 BRPM | WEIGHT: 145.9 LBS

## 2025-06-12 DIAGNOSIS — R91.8 LUNG NODULES: ICD-10-CM

## 2025-06-12 DIAGNOSIS — K21.9 GASTROESOPHAGEAL REFLUX DISEASE WITHOUT ESOPHAGITIS: ICD-10-CM

## 2025-06-12 DIAGNOSIS — Z87.891 PERSONAL HISTORY OF TOBACCO USE: ICD-10-CM

## 2025-06-12 DIAGNOSIS — J44.89 COPD WITH ASTHMA (HCC): Primary | ICD-10-CM

## 2025-06-12 PROCEDURE — 99214 OFFICE O/P EST MOD 30 MIN: CPT | Performed by: NURSE PRACTITIONER

## 2025-06-12 PROCEDURE — 3074F SYST BP LT 130 MM HG: CPT | Performed by: NURSE PRACTITIONER

## 2025-06-12 PROCEDURE — 3023F SPIROM DOC REV: CPT | Performed by: NURSE PRACTITIONER

## 2025-06-12 PROCEDURE — 1036F TOBACCO NON-USER: CPT | Performed by: NURSE PRACTITIONER

## 2025-06-12 PROCEDURE — 1160F RVW MEDS BY RX/DR IN RCRD: CPT | Performed by: NURSE PRACTITIONER

## 2025-06-12 PROCEDURE — 1126F AMNT PAIN NOTED NONE PRSNT: CPT | Performed by: NURSE PRACTITIONER

## 2025-06-12 PROCEDURE — 1159F MED LIST DOCD IN RCRD: CPT | Performed by: NURSE PRACTITIONER

## 2025-06-12 PROCEDURE — 1123F ACP DISCUSS/DSCN MKR DOCD: CPT | Performed by: NURSE PRACTITIONER

## 2025-06-12 PROCEDURE — 1090F PRES/ABSN URINE INCON ASSESS: CPT | Performed by: NURSE PRACTITIONER

## 2025-06-12 PROCEDURE — 3078F DIAST BP <80 MM HG: CPT | Performed by: NURSE PRACTITIONER

## 2025-06-12 PROCEDURE — G8427 DOCREV CUR MEDS BY ELIG CLIN: HCPCS | Performed by: NURSE PRACTITIONER

## 2025-06-12 PROCEDURE — G8399 PT W/DXA RESULTS DOCUMENT: HCPCS | Performed by: NURSE PRACTITIONER

## 2025-06-12 PROCEDURE — G8417 CALC BMI ABV UP PARAM F/U: HCPCS | Performed by: NURSE PRACTITIONER

## 2025-06-12 RX ORDER — BUDESONIDE 0.5 MG/2ML
INHALANT ORAL
Qty: 120 ML | Refills: 11 | Status: SHIPPED | OUTPATIENT
Start: 2025-06-12

## 2025-06-12 RX ORDER — ALBUTEROL SULFATE 0.83 MG/ML
SOLUTION RESPIRATORY (INHALATION)
Qty: 360 ML | Refills: 11 | Status: SHIPPED | OUTPATIENT
Start: 2025-06-12

## 2025-06-12 ASSESSMENT — ENCOUNTER SYMPTOMS
SPUTUM PRODUCTION: 0
WHEEZING: 0
SHORTNESS OF BREATH: 1
HEMOPTYSIS: 0
COUGH: 1

## 2025-06-12 NOTE — PATIENT INSTRUCTIONS
Will begin maintenance regimen by way of nebulizer route, Bill to Medicare part B.    Albuterol 2-4 times daily via nebulizer.    Budesonide 2 times daily via nebulizer, gargle with water after use.    She can use albuterol inhaler, 2 puffs up to 4 times daily when away from home/nebulizer.    She will complete steroid Dosepak as prescribed by ER but advised to take each dose following meal.

## 2025-06-18 ENCOUNTER — CARE COORDINATION (OUTPATIENT)
Dept: CARE COORDINATION | Facility: CLINIC | Age: 85
End: 2025-06-18

## 2025-06-18 DIAGNOSIS — M17.12 PRIMARY OSTEOARTHRITIS OF LEFT KNEE: Primary | ICD-10-CM

## 2025-06-18 NOTE — CARE COORDINATION
Care Transitions Note    Follow Up Call     Patient Current Location:  Home: [address]  [place] [address]    [facility] Care Coordinator contacted the patient by telephone. Verified name and  as identifiers.    Additional needs identified to be addressed with provider   No needs identified                 Method of communication with provider: none.    Care Summary Note: Patient reports feeling much better since our conversation on 25.  She was seen for follow up with Pulmonary on 25.  Patient is using budesonide 0.5mg/2ml 2ml via nebulizer which she reports is helping very much.  Patient states she no longer feels short of breath.  RX addition of Albuterol (Proventil) (2.5mg/3ml) 0.083% nebulizer solution; 3 ml via nebz qid, COPD/Asthma.  Mrs. Mcwilliams reports feeling much better as she is breathing better.  She is scheduled to f/u with PCP/NP 25 at 10am.  Patient has no questions or concerns at this time.    Advance Care Planning:   Does patient have an Advance Directive: reviewed during previous call, see note. .    Medication Review:  No changes since last call.       Assessments:   Goals Addressed                   This Visit's Progress     Returns to baseline activity level.   On track     Patient/Family able to obtain medicine after d/c     Patient/Family able to verbalize medicine changes     Patient/Family aware and attends follow up appointments s/p d/c.     Patient/Family agrees to notify provider of any barriers to plan of care.    Patient/Family agrees to notify provider of any symptoms that indicate a worsening of condition    Patient/Family agrees to monitor and record BP,HR,O2 daily for MD review.                Follow Up Appointment:   Reviewed upcoming appointment(s).  Future Appointments         Provider Specialty Dept Phone    2025 10:00 AM Maryse Carrasco APRN - CNP Internal Medicine [phone]    2025 9:00 AM Lu Chery MD Cardiology

## 2025-06-25 ENCOUNTER — OFFICE VISIT (OUTPATIENT)
Dept: INTERNAL MEDICINE CLINIC | Facility: CLINIC | Age: 85
End: 2025-06-25
Payer: MEDICARE

## 2025-06-25 ENCOUNTER — CARE COORDINATION (OUTPATIENT)
Dept: CARE COORDINATION | Facility: CLINIC | Age: 85
End: 2025-06-25

## 2025-06-25 ENCOUNTER — RESULTS FOLLOW-UP (OUTPATIENT)
Dept: INTERNAL MEDICINE CLINIC | Facility: CLINIC | Age: 85
End: 2025-06-25

## 2025-06-25 VITALS
SYSTOLIC BLOOD PRESSURE: 121 MMHG | DIASTOLIC BLOOD PRESSURE: 55 MMHG | HEIGHT: 63 IN | WEIGHT: 143.6 LBS | HEART RATE: 95 BPM | OXYGEN SATURATION: 96 % | RESPIRATION RATE: 16 BRPM | TEMPERATURE: 96.8 F | BODY MASS INDEX: 25.45 KG/M2

## 2025-06-25 DIAGNOSIS — R53.83 OTHER FATIGUE: ICD-10-CM

## 2025-06-25 DIAGNOSIS — E89.0 POSTSURGICAL HYPOTHYROIDISM: ICD-10-CM

## 2025-06-25 DIAGNOSIS — R35.0 URINARY FREQUENCY: ICD-10-CM

## 2025-06-25 DIAGNOSIS — D50.8 IRON DEFICIENCY ANEMIA SECONDARY TO INADEQUATE DIETARY IRON INTAKE: ICD-10-CM

## 2025-06-25 DIAGNOSIS — J44.89 COPD WITH ASTHMA (HCC): ICD-10-CM

## 2025-06-25 DIAGNOSIS — E03.9 HYPOTHYROIDISM, UNSPECIFIED TYPE: ICD-10-CM

## 2025-06-25 DIAGNOSIS — N30.90 CYSTITIS: ICD-10-CM

## 2025-06-25 DIAGNOSIS — D50.9 IRON DEFICIENCY ANEMIA, UNSPECIFIED IRON DEFICIENCY ANEMIA TYPE: Primary | ICD-10-CM

## 2025-06-25 DIAGNOSIS — J44.89 COPD WITH ASTHMA (HCC): Primary | ICD-10-CM

## 2025-06-25 LAB
ALBUMIN SERPL-MCNC: 3 G/DL (ref 3.2–4.6)
ALBUMIN/GLOB SERPL: 0.9 (ref 1–1.9)
ALP SERPL-CCNC: 54 U/L (ref 35–104)
ALT SERPL-CCNC: 21 U/L (ref 8–45)
ANION GAP SERPL CALC-SCNC: 13 MMOL/L (ref 7–16)
APPEARANCE UR: CLEAR
AST SERPL-CCNC: 29 U/L (ref 15–37)
BACTERIA URNS QL MICRO: ABNORMAL /HPF
BASOPHILS # BLD: 0.09 K/UL (ref 0–0.2)
BASOPHILS NFR BLD: 1.1 % (ref 0–2)
BILIRUB SERPL-MCNC: 0.3 MG/DL (ref 0–1.2)
BILIRUB UR QL: NEGATIVE
BUN SERPL-MCNC: 26 MG/DL (ref 8–23)
CALCIUM SERPL-MCNC: 9.9 MG/DL (ref 8.8–10.2)
CHLORIDE SERPL-SCNC: 101 MMOL/L (ref 98–107)
CO2 SERPL-SCNC: 27 MMOL/L (ref 20–29)
COLOR UR: ABNORMAL
CREAT SERPL-MCNC: 1.12 MG/DL (ref 0.6–1.1)
CRP SERPL-MCNC: 3.9 MG/DL (ref 0–0.4)
DIFFERENTIAL METHOD BLD: ABNORMAL
EOSINOPHIL # BLD: 0.93 K/UL (ref 0–0.8)
EOSINOPHIL NFR BLD: 11 % (ref 0.5–7.8)
EPI CELLS #/AREA URNS HPF: ABNORMAL /HPF (ref 0–5)
ERYTHROCYTE [DISTWIDTH] IN BLOOD BY AUTOMATED COUNT: 12.8 % (ref 11.9–14.6)
ERYTHROCYTE [SEDIMENTATION RATE] IN BLOOD: 20 MM/HR (ref 0–30)
FERRITIN SERPL-MCNC: 227 NG/ML (ref 8–388)
GLOBULIN SER CALC-MCNC: 3.4 G/DL (ref 2.3–3.5)
GLUCOSE SERPL-MCNC: 138 MG/DL (ref 70–99)
GLUCOSE UR STRIP.AUTO-MCNC: NEGATIVE MG/DL
HCT VFR BLD AUTO: 33.1 % (ref 35.8–46.3)
HGB BLD-MCNC: 10.4 G/DL (ref 11.7–15.4)
HGB UR QL STRIP: NEGATIVE
HYALINE CASTS URNS QL MICRO: ABNORMAL /LPF
IMM GRANULOCYTES # BLD AUTO: 0.01 K/UL (ref 0–0.5)
IMM GRANULOCYTES NFR BLD AUTO: 0.1 % (ref 0–5)
IRON SATN MFR SERPL: 18 % (ref 20–50)
IRON SERPL-MCNC: 50 UG/DL (ref 35–100)
KETONES UR QL STRIP.AUTO: NEGATIVE MG/DL
LEUKOCYTE ESTERASE UR QL STRIP.AUTO: NEGATIVE
LYMPHOCYTES # BLD: 1.1 K/UL (ref 0.5–4.6)
LYMPHOCYTES NFR BLD: 13 % (ref 13–44)
MCH RBC QN AUTO: 30.1 PG (ref 26.1–32.9)
MCHC RBC AUTO-ENTMCNC: 31.4 G/DL (ref 31.4–35)
MCV RBC AUTO: 95.9 FL (ref 82–102)
MONOCYTES # BLD: 0.57 K/UL (ref 0.1–1.3)
MONOCYTES NFR BLD: 6.7 % (ref 4–12)
NEUTS SEG # BLD: 5.77 K/UL (ref 1.7–8.2)
NEUTS SEG NFR BLD: 68.1 % (ref 43–78)
NITRITE UR QL STRIP.AUTO: POSITIVE
NRBC # BLD: 0 K/UL (ref 0–0.2)
PH UR STRIP: 5.5 (ref 5–9)
PLATELET # BLD AUTO: 310 K/UL (ref 150–450)
PMV BLD AUTO: 9.7 FL (ref 9.4–12.3)
POTASSIUM SERPL-SCNC: 4.2 MMOL/L (ref 3.5–5.1)
PROT SERPL-MCNC: 6.3 G/DL (ref 6.3–8.2)
PROT UR STRIP-MCNC: NEGATIVE MG/DL
RBC # BLD AUTO: 3.45 M/UL (ref 4.05–5.2)
RBC #/AREA URNS HPF: ABNORMAL /HPF (ref 0–5)
SODIUM SERPL-SCNC: 140 MMOL/L (ref 136–145)
SP GR UR REFRACTOMETRY: 1.02 (ref 1–1.02)
T4 FREE SERPL-MCNC: 1.2 NG/DL (ref 0.9–1.7)
TIBC SERPL-MCNC: 277 UG/DL (ref 240–450)
TSH W FREE THYROID IF ABNORMAL: 17.3 UIU/ML (ref 0.27–4.2)
UIBC SERPL-MCNC: 227 UG/DL (ref 112–347)
UROBILINOGEN UR QL STRIP.AUTO: 0.2 EU/DL (ref 0.2–1)
VIT B12 SERPL-MCNC: 540 PG/ML (ref 193–986)
WBC # BLD AUTO: 8.5 K/UL (ref 4.3–11.1)
WBC URNS QL MICRO: ABNORMAL /HPF (ref 0–4)

## 2025-06-25 PROCEDURE — G8417 CALC BMI ABV UP PARAM F/U: HCPCS | Performed by: NURSE PRACTITIONER

## 2025-06-25 PROCEDURE — 3078F DIAST BP <80 MM HG: CPT | Performed by: NURSE PRACTITIONER

## 2025-06-25 PROCEDURE — 3023F SPIROM DOC REV: CPT | Performed by: NURSE PRACTITIONER

## 2025-06-25 PROCEDURE — 1090F PRES/ABSN URINE INCON ASSESS: CPT | Performed by: NURSE PRACTITIONER

## 2025-06-25 PROCEDURE — G8427 DOCREV CUR MEDS BY ELIG CLIN: HCPCS | Performed by: NURSE PRACTITIONER

## 2025-06-25 PROCEDURE — 99214 OFFICE O/P EST MOD 30 MIN: CPT | Performed by: NURSE PRACTITIONER

## 2025-06-25 PROCEDURE — 3074F SYST BP LT 130 MM HG: CPT | Performed by: NURSE PRACTITIONER

## 2025-06-25 PROCEDURE — G2211 COMPLEX E/M VISIT ADD ON: HCPCS | Performed by: NURSE PRACTITIONER

## 2025-06-25 PROCEDURE — 1036F TOBACCO NON-USER: CPT | Performed by: NURSE PRACTITIONER

## 2025-06-25 PROCEDURE — 1123F ACP DISCUSS/DSCN MKR DOCD: CPT | Performed by: NURSE PRACTITIONER

## 2025-06-25 PROCEDURE — 1159F MED LIST DOCD IN RCRD: CPT | Performed by: NURSE PRACTITIONER

## 2025-06-25 PROCEDURE — G8399 PT W/DXA RESULTS DOCUMENT: HCPCS | Performed by: NURSE PRACTITIONER

## 2025-06-25 RX ORDER — FERROUS SULFATE 325(65) MG
325 TABLET ORAL
Qty: 90 TABLET | Refills: 1 | Status: SHIPPED | OUTPATIENT
Start: 2025-06-25

## 2025-06-25 RX ORDER — LEVOTHYROXINE SODIUM 100 UG/1
100 TABLET ORAL DAILY
Qty: 30 TABLET | Refills: 5 | Status: SHIPPED | OUTPATIENT
Start: 2025-06-25

## 2025-06-25 RX ORDER — CEPHALEXIN 500 MG/1
500 CAPSULE ORAL 2 TIMES DAILY
Qty: 10 CAPSULE | Refills: 0 | Status: SHIPPED | OUTPATIENT
Start: 2025-06-25 | End: 2025-06-30

## 2025-06-25 ASSESSMENT — ENCOUNTER SYMPTOMS
COUGH: 1
VOMITING: 0
WHEEZING: 0
SHORTNESS OF BREATH: 1
NAUSEA: 0

## 2025-06-25 NOTE — PROGRESS NOTES
6/25/2025 10:14 AM  Location:[facility] PHYSICIAN SERVICES  [facility] [place] MEDICINE  [place]  Patient #:  [id number]  YOB: 1940    Reason for Visit  2 Week Follow-Up Patient presents in the office today for a 2 week follow up on SoB and fluctuating blood pressures   CTA CHEST W WO CONTRAST  Order: [id number]  Impression      No evidence of acute pulmonary embolus.     Bilateral pulmonary nodules measuring up to 7 mm in size, at least one of which has slightly increased in size. Follow-up CT in 6 months recommended for further evaluation.        Nuclear Stress Study Impression  Left ventricular perfusion is normal. No ischemia or infarct.      Can[facility]ag PACS - 06/05/2025 12:24 PM EDT     Normal left ventricle cavity size.    The left ventricular systolic function is normal (55-65%).    Normal left ventricular diastolic function.    The right ventricular systolic function is normal.    No significant valvular abnormalities.             History of Present Illness  The patient presents for a 2-week follow-up. She was seen on 06/09/2025 for periods of shortness of breath, post hospital discharge. Lab work showed mild ELMER, anemia, hyponatremia, and dehydration. She had an elevated D-dimer and underwent a chest CT, which showed no evidence of pulmonary embolus but did reveal pulmonary nodules measuring up to 7 mm in size, one of which has slightly increased. She went to the emergency department and was treated for COPD with steroids, Zithromax, and an inhaler, which helped significantly. She has followed up with her pulmonologist and has been started on budesonide.  New medications include Metoprolol for episode of ventricular arrythmia during last hospitalization at the beginning of June.   She has follow up with cardiology upcoming and at last OV a holter monitor was ordered, however, she did not get this done.   Has felt poorly and lacking energy since beginning of June with 2 hospital visits.

## 2025-06-25 NOTE — CARE COORDINATION
Care Transitions Note    Follow Up Call     Attempted to reach patient for transitions of care follow up.  Unable to reach patient.  Spoke with patient's  who reports patient is not home right now but is doing much better.  Mr. Mcwilliams states he will give Mrs. Mcwilliams the message to return call if needed.    Outreach Attempts:   Left message with patient's spouse.      Follow Up Appointment:   Future Appointments         Provider Specialty Dept Phone    7/9/2025 9:00 AM Lu Chery MD Cardiology [phone]    8/12/2025 10:30 AM [facility] ASSESSMENT RM 05 Pre-Admission Testing [phone]    8/25/2025 3:00 PM Dawna Smalls MD Internal Medicine [phone]    8/28/2025 8:00 AM (Arrive by 7:45 AM) Cornell Reid Jr., MD Orthopedic Surgery [phone]    10/14/2025 10:00 AM (Arrive by 9:45 AM) Cornell Reid Jr., MD Orthopedic Surgery [phone]    4/20/2026 1:15 PM (Arrive by 1:00 PM) Tammy Qureshi, APRN - [facility] Pulmonology [phone]            Plan for follow-up call in 6-10 days based on severity of symptoms and risk factors. Plan for next call: symptom management    Taylor Greenwood LPN

## 2025-06-27 LAB
BACTERIA SPEC CULT: ABNORMAL
SERVICE CMNT-IMP: ABNORMAL

## 2025-06-30 ENCOUNTER — RESULTS FOLLOW-UP (OUTPATIENT)
Dept: INTERNAL MEDICINE CLINIC | Facility: CLINIC | Age: 85
End: 2025-06-30

## 2025-06-30 DIAGNOSIS — D50.9 IRON DEFICIENCY ANEMIA, UNSPECIFIED IRON DEFICIENCY ANEMIA TYPE: Primary | ICD-10-CM

## 2025-06-30 DIAGNOSIS — R19.5 POSITIVE FIT (FECAL IMMUNOCHEMICAL TEST): ICD-10-CM

## 2025-06-30 LAB
HEMOCCULT STL QL: POSITIVE
VALID INTERNAL CONTROL: YES

## 2025-07-07 ENCOUNTER — CARE COORDINATION (OUTPATIENT)
Dept: CARE COORDINATION | Facility: CLINIC | Age: 85
End: 2025-07-07

## 2025-07-07 NOTE — CARE COORDINATION
Care Transitions Note    Final Call     Patient Current Location:  Home: [address] [place] [phone]    LPN Care Coordinator contacted the patient by telephone. Verified name and  as identifiers.    Patient graduated from the Care Transitions program on 25.  Patient/family verbalizes confidence in the ability to self-manage at this time..      Advance Care Planning:   Does patient have an Advance Directive: reviewed during previous call, see note. .    Handoff:   Patient was not referred to the ACM team due to patient declined services.      Care Summary Note: Patient reports feeling much better and stronger day by day.  She is scheduled 25 for L TKA with Dr Reid and reports needing a CT which has not yet been scheduled. Noted open referral for CT L knee at [facility].  Provided number for [facility] which patient states she will call today.  Upcoming appointments are scheduled appropriately.  Patient voiced no further questions or concerns.  Patient verified having this LPN CC's contact information should a need arise.    Assessments:   Goals Addressed                   This Visit's Progress     COMPLETED: Returns to baseline activity level.   On track     Patient/Family able to obtain medicine after d/c     Patient/Family able to verbalize medicine changes     Patient/Family aware and attends follow up appointments s/p d/c.     Patient/Family agrees to notify provider of any barriers to plan of care.    Patient/Family agrees to notify provider of any symptoms that indicate a worsening of condition    Patient/Family agrees to monitor and record BP,HR,O2 daily for MD review.                Upcoming Appointments:    Future Appointments         Provider Specialty Dept Phone    2025 9:00 AM Lu Chery MD Cardiology [phone]    2025 9:40 AM Maryse Carrasco APRN - CNP Internal Medicine [phone]    2025 10:30 AM [facility] ASSESSMENT  05

## 2025-07-08 NOTE — PROGRESS NOTES
[facility] CARDIOLOGY  [address] [place] [address], [address]  [place] [address]  PHONE: [phone]      25    NAME:  Renita Mcwilliams  : 1940  MRN: [id number]         SUBJECTIVE:   Renita Mcwilliams is a 85 y.o. female seen for a consultation visit regarding the following:     Chief Complaint   Patient presents with    Hypertension    Hyperlipidemia            HPI:  Consultation is requested by [facility] Medel for evaluation of Hypertension and Hyperlipidemia   .    Consult for palpitations.  PCP previously ordered a monitor but patient never had it done. She recently had normal echo and nuclear stress test at [facility] during admission for COPD exacerbation.  The patient doesn't feel any palpitations, says she feels very well, denies cp, palpitations, dyspnea.  Up until last year she was playing tennis twice a week but is now waiting knee replacement with Dr Reid in 5 months or so, still doing strengthening exercises at home.     Reviewed her discharge note from her COPD exacerbation.  In the body of the note the housestaff mention she had a \"short run of VT\" and gave her metoprolol.  [facility] was consulted, she had normal stress perfusion study and echo and they signed off.        PAST CARDIAC HISTORY:  May 2025 - [facility]       NMST - normal perfusion and EF       Echo - normal SF, DF, valves        Cardiac Medications       Thiazides and Thiazide-Like Diuretics       hydroCHLOROthiazide 12.5 MG tablet Take 1 tablet by mouth daily       HMG CoA Reductase Inhibitors       lovastatin (MEVACOR) 20 MG tablet Take 1 tablet by mouth nightly       Angiotensin II Receptor Antagonists       losartan (COZAAR) 50 MG tablet Take 1 tablet by mouth daily       Salicylates       aspirin 81 MG EC tablet Take 1 tablet by mouth daily                      Past Medical History, Past Surgical History, Family history, Social History, and Medications were all reviewed with the patient today and

## 2025-07-09 ENCOUNTER — INITIAL CONSULT (OUTPATIENT)
Age: 85
End: 2025-07-09
Payer: MEDICARE

## 2025-07-09 VITALS
SYSTOLIC BLOOD PRESSURE: 124 MMHG | HEIGHT: 62 IN | HEART RATE: 80 BPM | BODY MASS INDEX: 26.31 KG/M2 | WEIGHT: 143 LBS | DIASTOLIC BLOOD PRESSURE: 56 MMHG

## 2025-07-09 DIAGNOSIS — I10 ESSENTIAL HYPERTENSION: ICD-10-CM

## 2025-07-09 DIAGNOSIS — R00.2 PALPITATIONS: Primary | ICD-10-CM

## 2025-07-09 DIAGNOSIS — E78.5 DYSLIPIDEMIA: ICD-10-CM

## 2025-07-09 PROCEDURE — 1126F AMNT PAIN NOTED NONE PRSNT: CPT | Performed by: INTERNAL MEDICINE

## 2025-07-09 PROCEDURE — 1159F MED LIST DOCD IN RCRD: CPT | Performed by: INTERNAL MEDICINE

## 2025-07-09 PROCEDURE — G8417 CALC BMI ABV UP PARAM F/U: HCPCS | Performed by: INTERNAL MEDICINE

## 2025-07-09 PROCEDURE — G8399 PT W/DXA RESULTS DOCUMENT: HCPCS | Performed by: INTERNAL MEDICINE

## 2025-07-09 PROCEDURE — 3078F DIAST BP <80 MM HG: CPT | Performed by: INTERNAL MEDICINE

## 2025-07-09 PROCEDURE — 1036F TOBACCO NON-USER: CPT | Performed by: INTERNAL MEDICINE

## 2025-07-09 PROCEDURE — 99204 OFFICE O/P NEW MOD 45 MIN: CPT | Performed by: INTERNAL MEDICINE

## 2025-07-09 PROCEDURE — 1123F ACP DISCUSS/DSCN MKR DOCD: CPT | Performed by: INTERNAL MEDICINE

## 2025-07-09 PROCEDURE — 3074F SYST BP LT 130 MM HG: CPT | Performed by: INTERNAL MEDICINE

## 2025-07-09 PROCEDURE — 1160F RVW MEDS BY RX/DR IN RCRD: CPT | Performed by: INTERNAL MEDICINE

## 2025-07-09 PROCEDURE — G8427 DOCREV CUR MEDS BY ELIG CLIN: HCPCS | Performed by: INTERNAL MEDICINE

## 2025-07-09 PROCEDURE — 1090F PRES/ABSN URINE INCON ASSESS: CPT | Performed by: INTERNAL MEDICINE

## 2025-07-09 RX ORDER — M-VIT,TX,IRON,MINS/CALC/FOLIC 27MG-0.4MG
1 TABLET ORAL DAILY
COMMUNITY

## 2025-07-09 ASSESSMENT — ENCOUNTER SYMPTOMS: SHORTNESS OF BREATH: 0

## 2025-07-30 ENCOUNTER — OFFICE VISIT (OUTPATIENT)
Dept: INTERNAL MEDICINE CLINIC | Facility: CLINIC | Age: 85
End: 2025-07-30

## 2025-07-30 VITALS
HEART RATE: 82 BPM | WEIGHT: 144.8 LBS | RESPIRATION RATE: 17 BRPM | OXYGEN SATURATION: 96 % | BODY MASS INDEX: 25.66 KG/M2 | DIASTOLIC BLOOD PRESSURE: 58 MMHG | TEMPERATURE: 97 F | SYSTOLIC BLOOD PRESSURE: 126 MMHG | HEIGHT: 63 IN

## 2025-07-30 DIAGNOSIS — D50.8 IRON DEFICIENCY ANEMIA SECONDARY TO INADEQUATE DIETARY IRON INTAKE: Primary | ICD-10-CM

## 2025-07-30 DIAGNOSIS — D50.8 IRON DEFICIENCY ANEMIA SECONDARY TO INADEQUATE DIETARY IRON INTAKE: ICD-10-CM

## 2025-07-30 DIAGNOSIS — E89.0 POSTSURGICAL HYPOTHYROIDISM: ICD-10-CM

## 2025-07-30 DIAGNOSIS — J44.89 COPD WITH ASTHMA (HCC): ICD-10-CM

## 2025-07-30 LAB
ANION GAP SERPL CALC-SCNC: 14 MMOL/L (ref 7–16)
BASOPHILS # BLD: 0.07 K/UL (ref 0–0.2)
BASOPHILS NFR BLD: 1.3 % (ref 0–2)
BUN SERPL-MCNC: 22 MG/DL (ref 8–23)
CALCIUM SERPL-MCNC: 9.4 MG/DL (ref 8.8–10.2)
CHLORIDE SERPL-SCNC: 103 MMOL/L (ref 98–107)
CO2 SERPL-SCNC: 26 MMOL/L (ref 20–29)
CREAT SERPL-MCNC: 1.08 MG/DL (ref 0.6–1.1)
CRP SERPL-MCNC: <0.3 MG/DL (ref 0–0.4)
DIFFERENTIAL METHOD BLD: ABNORMAL
EOSINOPHIL # BLD: 0.36 K/UL (ref 0–0.8)
EOSINOPHIL NFR BLD: 6.5 % (ref 0.5–7.8)
ERYTHROCYTE [DISTWIDTH] IN BLOOD BY AUTOMATED COUNT: 13.5 % (ref 11.9–14.6)
GLUCOSE SERPL-MCNC: 125 MG/DL (ref 70–99)
HCT VFR BLD AUTO: 34.7 % (ref 35.8–46.3)
HGB BLD-MCNC: 11 G/DL (ref 11.7–15.4)
IMM GRANULOCYTES # BLD AUTO: 0.02 K/UL (ref 0–0.5)
IMM GRANULOCYTES NFR BLD AUTO: 0.4 % (ref 0–5)
LYMPHOCYTES # BLD: 1.47 K/UL (ref 0.5–4.6)
LYMPHOCYTES NFR BLD: 26.7 % (ref 13–44)
MCH RBC QN AUTO: 29.8 PG (ref 26.1–32.9)
MCHC RBC AUTO-ENTMCNC: 31.7 G/DL (ref 31.4–35)
MCV RBC AUTO: 94 FL (ref 82–102)
MONOCYTES # BLD: 0.33 K/UL (ref 0.1–1.3)
MONOCYTES NFR BLD: 6 % (ref 4–12)
NEUTS SEG # BLD: 3.25 K/UL (ref 1.7–8.2)
NEUTS SEG NFR BLD: 59.1 % (ref 43–78)
NRBC # BLD: 0 K/UL (ref 0–0.2)
PLATELET # BLD AUTO: 273 K/UL (ref 150–450)
PMV BLD AUTO: 10.1 FL (ref 9.4–12.3)
POTASSIUM SERPL-SCNC: 4.1 MMOL/L (ref 3.5–5.1)
RBC # BLD AUTO: 3.69 M/UL (ref 4.05–5.2)
SODIUM SERPL-SCNC: 142 MMOL/L (ref 136–145)
TSH W FREE THYROID IF ABNORMAL: 1.98 UIU/ML (ref 0.27–4.2)
WBC # BLD AUTO: 5.5 K/UL (ref 4.3–11.1)

## 2025-07-30 RX ORDER — ALBUTEROL SULFATE 90 UG/1
2 INHALANT RESPIRATORY (INHALATION) EVERY 4 HOURS PRN
Qty: 18 G | Refills: 3 | Status: SHIPPED | OUTPATIENT
Start: 2025-07-30

## 2025-07-30 ASSESSMENT — ENCOUNTER SYMPTOMS
CONSTIPATION: 0
NAUSEA: 0
BLOOD IN STOOL: 0
WHEEZING: 0
VOMITING: 0
DIARRHEA: 0
SHORTNESS OF BREATH: 0

## 2025-07-30 NOTE — PROGRESS NOTES
7/30/2025 9:48 AM  Location:[facility] PHYSICIAN SERVICES  [facility] MEDICINE  [place]  Patient #:  [id number]  YOB: 1940    Reason for Visit  1 Month Follow-Up Patient presents in the office today for a 1 month follow up on hypothyroidism and iron levels.     History of Present Illness  The patient presents for a follow-up visit. She was last seen on 06/25/2025 and had a positive urine culture, elevated CRP, and a positive FIT test with anemia. She was referred to a gastroenterologist and had elevated TSH, leading to an adjustment in her thyroid medication.    She reports feeling well overall. She has an upcoming appointment with a gastroenterologist on 08/19/2025 due to PRUDENCE, hx of polyps. She does not observe any blood in her stool, which appears brownish in color. She is currently taking an iron supplement. She has a history of ulcers but has not experienced a flare-up in years. She underwent an endoscopy approximately 3 years ago and a colonoscopy in 2021. She has a history of polyps, but the endoscopy results were normal. She was prescribed meloxicam by Dr. Reid, which she took about 3 times before discontinuing it due to its potential to cause bleeding and GI irritation.    She uses a nebulizer for breathing difficulties, which she finds helpful, especially when walking her dog in humid conditions. She has run out of her albuterol inhaler and has not had it refilled. She quit smoking 20 years ago and monitors her oxygen levels at home.    During a hospital stay, she had a single episode of arrhythmia and was given a prescription(Metoprolol), which she took as prescribed but is not currently taking. Her cardiologist reviewed her discharge papers and found her EKG and echocardiogram to be normal. She does not experience palpitations and reports feeling well.    Her Synthroid dose was increased last month.    Social History:  Hobbies: Walking her dog  Tobacco: She quit smoking 20

## 2025-08-12 ENCOUNTER — HOSPITAL ENCOUNTER (OUTPATIENT)
Dept: REHABILITATION | Age: 85
Discharge: HOME | End: 2025-08-15
Payer: MEDICARE

## 2025-08-12 ENCOUNTER — HOSPITAL ENCOUNTER (OUTPATIENT)
Dept: SURGERY | Age: 85
Discharge: HOME | End: 2025-08-15
Payer: MEDICARE

## 2025-08-12 VITALS
SYSTOLIC BLOOD PRESSURE: 145 MMHG | RESPIRATION RATE: 16 BRPM | HEIGHT: 63 IN | TEMPERATURE: 97.1 F | DIASTOLIC BLOOD PRESSURE: 58 MMHG | BODY MASS INDEX: 25.59 KG/M2 | OXYGEN SATURATION: 96 % | WEIGHT: 144.4 LBS | HEART RATE: 86 BPM

## 2025-08-12 DIAGNOSIS — M17.12 PRIMARY OSTEOARTHRITIS OF LEFT KNEE: ICD-10-CM

## 2025-08-12 LAB
ALBUMIN SERPL-MCNC: 3.6 G/DL (ref 3.2–4.6)
ALBUMIN/GLOB SERPL: 1.2 (ref 1–1.9)
ALP SERPL-CCNC: 61 U/L (ref 35–104)
ALT SERPL-CCNC: 19 U/L (ref 8–45)
ANION GAP SERPL CALC-SCNC: 14 MMOL/L (ref 7–16)
AST SERPL-CCNC: 28 U/L (ref 15–37)
BASOPHILS # BLD: 0.05 K/UL (ref 0–0.2)
BASOPHILS NFR BLD: 0.9 % (ref 0–2)
BILIRUB SERPL-MCNC: 0.3 MG/DL (ref 0–1.2)
BUN SERPL-MCNC: 22 MG/DL (ref 8–23)
CALCIUM SERPL-MCNC: 9.4 MG/DL (ref 8.8–10.2)
CHLORIDE SERPL-SCNC: 104 MMOL/L (ref 98–107)
CO2 SERPL-SCNC: 24 MMOL/L (ref 20–29)
CREAT SERPL-MCNC: 1.04 MG/DL (ref 0.6–1.1)
DIFFERENTIAL METHOD BLD: ABNORMAL
EOSINOPHIL # BLD: 0.27 K/UL (ref 0–0.8)
EOSINOPHIL NFR BLD: 5 % (ref 0.5–7.8)
ERYTHROCYTE [DISTWIDTH] IN BLOOD BY AUTOMATED COUNT: 13.2 % (ref 11.9–14.6)
EST. AVERAGE GLUCOSE BLD GHB EST-MCNC: 133 MG/DL
GLOBULIN SER CALC-MCNC: 3 G/DL (ref 2.3–3.5)
GLUCOSE SERPL-MCNC: 114 MG/DL (ref 70–99)
HBA1C MFR BLD: 6.3 % (ref 0–5.6)
HCT VFR BLD AUTO: 32.9 % (ref 35.8–46.3)
HGB BLD-MCNC: 10.7 G/DL (ref 11.7–15.4)
IMM GRANULOCYTES # BLD AUTO: 0.01 K/UL (ref 0–0.5)
IMM GRANULOCYTES NFR BLD AUTO: 0.2 % (ref 0–5)
INR PPP: 1
LYMPHOCYTES # BLD: 1.72 K/UL (ref 0.5–4.6)
LYMPHOCYTES NFR BLD: 31.7 % (ref 13–44)
MCH RBC QN AUTO: 29.3 PG (ref 26.1–32.9)
MCHC RBC AUTO-ENTMCNC: 32.5 G/DL (ref 31.4–35)
MCV RBC AUTO: 90.1 FL (ref 82–102)
MONOCYTES # BLD: 0.38 K/UL (ref 0.1–1.3)
MONOCYTES NFR BLD: 7 % (ref 4–12)
MRSA DNA SPEC QL NAA+PROBE: NOT DETECTED
NEUTS SEG # BLD: 2.99 K/UL (ref 1.7–8.2)
NEUTS SEG NFR BLD: 55.2 % (ref 43–78)
NRBC # BLD: 0 K/UL (ref 0–0.2)
PLATELET # BLD AUTO: 314 K/UL (ref 150–450)
PMV BLD AUTO: 9 FL (ref 9.4–12.3)
POTASSIUM SERPL-SCNC: 3.9 MMOL/L (ref 3.5–5.1)
PROT SERPL-MCNC: 6.6 G/DL (ref 6.3–8.2)
PROTHROMBIN TIME: 13.1 SEC (ref 11.3–14.9)
RBC # BLD AUTO: 3.65 M/UL (ref 4.05–5.2)
S AUREUS CPE NOSE QL NAA+PROBE: NOT DETECTED
SODIUM SERPL-SCNC: 142 MMOL/L (ref 136–145)
WBC # BLD AUTO: 5.4 K/UL (ref 4.3–11.1)

## 2025-08-12 PROCEDURE — 94664 DEMO&/EVAL PT USE INHALER: CPT

## 2025-08-12 PROCEDURE — 98960 EDU&TRN PT SELF-MGMT NQHP 1: CPT

## 2025-08-12 PROCEDURE — 83036 HEMOGLOBIN GLYCOSYLATED A1C: CPT

## 2025-08-12 PROCEDURE — 97161 PT EVAL LOW COMPLEX 20 MIN: CPT

## 2025-08-12 PROCEDURE — 94760 N-INVAS EAR/PLS OXIMETRY 1: CPT

## 2025-08-12 PROCEDURE — 85025 COMPLETE CBC W/AUTO DIFF WBC: CPT

## 2025-08-12 PROCEDURE — 80053 COMPREHEN METABOLIC PANEL: CPT

## 2025-08-12 PROCEDURE — 87641 MR-STAPH DNA AMP PROBE: CPT

## 2025-08-12 PROCEDURE — 85610 PROTHROMBIN TIME: CPT

## 2025-08-12 ASSESSMENT — PROMIS GLOBAL HEALTH SCALE
IN GENERAL, WOULD YOU SAY YOUR QUALITY OF LIFE IS...[ON A SCALE OF 1 (POOR) TO 5 (EXCELLENT)]: GOOD
IN GENERAL, HOW WOULD YOU RATE YOUR SATISFACTION WITH YOUR SOCIAL ACTIVITIES AND RELATIONSHIPS [ON A SCALE OF 1 (POOR) TO 5 (EXCELLENT)]?: VERY GOOD
IN THE PAST 7 DAYS, HOW WOULD YOU RATE YOUR FATIGUE ON AVERAGE [ON A SCALE FROM 1 (NONE) TO 5 (VERY SEVERE)]?: MODERATE
TO WHAT EXTENT ARE YOU ABLE TO CARRY OUT YOUR EVERYDAY PHYSICAL ACTIVITIES SUCH AS WALKING, CLIMBING STAIRS, CARRYING GROCERIES, OR MOVING A CHAIR [ON A SCALE OF 1 (NOT AT ALL) TO 5 (COMPLETELY)]?: MODERATELY
IN GENERAL, HOW WOULD YOU RATE YOUR PHYSICAL HEALTH [ON A SCALE OF 1 (POOR) TO 5 (EXCELLENT)]?: GOOD
SUM OF RESPONSES TO QUESTIONS 3, 6, 7, & 8: 14
IN GENERAL, PLEASE RATE HOW WELL YOU CARRY OUT YOUR USUAL SOCIAL ACTIVITIES (INCLUDES ACTIVITIES AT HOME, AT WORK, AND IN YOUR COMMUNITY, AND RESPONSIBILITIES AS A PARENT, CHILD, SPOUSE, EMPLOYEE, FRIEND, ETC) [ON A SCALE OF 1 (POOR) TO 5 (EXCELLENT)]?: VERY GOOD
IN THE PAST 7 DAYS, HOW OFTEN HAVE YOU BEEN BOTHERED BY EMOTIONAL PROBLEMS, SUCH AS FEELING ANXIOUS, DEPRESSED, OR IRRITABLE [ON A SCALE FROM 1 (NEVER) TO 5 (ALWAYS)]?: SOMETIMES
SUM OF RESPONSES TO QUESTIONS 2, 4, 5, & 10: 14
IN GENERAL, WOULD YOU SAY YOUR HEALTH IS...[ON A SCALE OF 1 (POOR) TO 5 (EXCELLENT)]: GOOD
WHO IS THE PERSON COMPLETING THE PROMIS V1.1 SURVEY?: SELF
HOW IS THE PROMIS V1.1 BEING ADMINISTERED?: PAPER
IN THE PAST 7 DAYS, HOW WOULD YOU RATE YOUR PAIN ON AVERAGE [ON A SCALE FROM 0 (NO PAIN) TO 10 (WORST IMAGINABLE PAIN)]?: 5
IN GENERAL, HOW WOULD YOU RATE YOUR MENTAL HEALTH, INCLUDING YOUR MOOD AND YOUR ABILITY TO THINK [ON A SCALE OF 1 (POOR) TO 5 (EXCELLENT)]?: VERY GOOD

## 2025-08-12 ASSESSMENT — KOOS JR
STRAIGHTENING KNEE FULLY: SEVERE
RISING FROM SITTING: MODERATE
STANDING UPRIGHT: MODERATE
TWISING OR PIVOTING ON KNEE: MODERATE
HOW SEVERE IS YOUR KNEE STIFFNESS AFTER FIRST WAKING IN MORNING: MODERATE
KOOS JR TOTAL INTERVAL SCORE: 44.905
BENDING TO THE FLOOR TO PICK UP OBJECT: SEVERE
GOING UP OR DOWN STAIRS: SEVERE

## 2025-08-12 ASSESSMENT — PAIN SCALES - GENERAL
PAINLEVEL_OUTOF10: 0
PAINLEVEL_OUTOF10: 6

## 2025-08-12 ASSESSMENT — PULMONARY FUNCTION TESTS
FEV1 (%PREDICTED): 69
FEV1 (LITERS): 1.14

## 2025-08-19 ENCOUNTER — OFFICE VISIT (OUTPATIENT)
Dept: GASTROENTEROLOGY | Age: 85
End: 2025-08-19
Payer: MEDICARE

## 2025-08-19 VITALS
OXYGEN SATURATION: 97 % | WEIGHT: 144 LBS | HEART RATE: 92 BPM | SYSTOLIC BLOOD PRESSURE: 125 MMHG | DIASTOLIC BLOOD PRESSURE: 63 MMHG | BODY MASS INDEX: 25.52 KG/M2 | HEIGHT: 63 IN

## 2025-08-19 DIAGNOSIS — D50.9 IRON DEFICIENCY ANEMIA, UNSPECIFIED IRON DEFICIENCY ANEMIA TYPE: Primary | ICD-10-CM

## 2025-08-19 DIAGNOSIS — Z87.19 HISTORY OF BARRETT'S ESOPHAGUS: ICD-10-CM

## 2025-08-19 DIAGNOSIS — R19.5 POSITIVE FIT (FECAL IMMUNOCHEMICAL TEST): ICD-10-CM

## 2025-08-19 PROCEDURE — 1036F TOBACCO NON-USER: CPT | Performed by: PHYSICIAN ASSISTANT

## 2025-08-19 PROCEDURE — 1159F MED LIST DOCD IN RCRD: CPT | Performed by: PHYSICIAN ASSISTANT

## 2025-08-19 PROCEDURE — 1123F ACP DISCUSS/DSCN MKR DOCD: CPT | Performed by: PHYSICIAN ASSISTANT

## 2025-08-19 PROCEDURE — G8399 PT W/DXA RESULTS DOCUMENT: HCPCS | Performed by: PHYSICIAN ASSISTANT

## 2025-08-19 PROCEDURE — 99204 OFFICE O/P NEW MOD 45 MIN: CPT | Performed by: PHYSICIAN ASSISTANT

## 2025-08-19 PROCEDURE — 1160F RVW MEDS BY RX/DR IN RCRD: CPT | Performed by: PHYSICIAN ASSISTANT

## 2025-08-19 PROCEDURE — 3074F SYST BP LT 130 MM HG: CPT | Performed by: PHYSICIAN ASSISTANT

## 2025-08-19 PROCEDURE — 3078F DIAST BP <80 MM HG: CPT | Performed by: PHYSICIAN ASSISTANT

## 2025-08-19 PROCEDURE — G8427 DOCREV CUR MEDS BY ELIG CLIN: HCPCS | Performed by: PHYSICIAN ASSISTANT

## 2025-08-19 PROCEDURE — 1090F PRES/ABSN URINE INCON ASSESS: CPT | Performed by: PHYSICIAN ASSISTANT

## 2025-08-19 PROCEDURE — G8417 CALC BMI ABV UP PARAM F/U: HCPCS | Performed by: PHYSICIAN ASSISTANT

## 2025-08-21 ENCOUNTER — TELEPHONE (OUTPATIENT)
Age: 85
End: 2025-08-21

## 2025-08-26 ENCOUNTER — ANESTHESIA EVENT (OUTPATIENT)
Dept: ENDOSCOPY | Age: 85
End: 2025-08-26
Payer: MEDICARE

## 2025-08-26 ENCOUNTER — TELEPHONE (OUTPATIENT)
Dept: ORTHOPEDIC SURGERY | Age: 85
End: 2025-08-26

## 2025-08-26 RX ORDER — SODIUM CHLORIDE 9 MG/ML
INJECTION, SOLUTION INTRAVENOUS PRN
Status: CANCELLED | OUTPATIENT
Start: 2025-08-26

## 2025-08-26 RX ORDER — SODIUM CHLORIDE 0.9 % (FLUSH) 0.9 %
5-40 SYRINGE (ML) INJECTION PRN
Status: CANCELLED | OUTPATIENT
Start: 2025-08-26

## 2025-08-26 RX ORDER — SODIUM CHLORIDE 0.9 % (FLUSH) 0.9 %
5-40 SYRINGE (ML) INJECTION EVERY 12 HOURS SCHEDULED
Status: CANCELLED | OUTPATIENT
Start: 2025-08-26

## 2025-08-26 RX ORDER — ONDANSETRON 2 MG/ML
4 INJECTION INTRAMUSCULAR; INTRAVENOUS
Status: CANCELLED | OUTPATIENT
Start: 2025-08-26

## 2025-08-26 RX ORDER — SODIUM CHLORIDE, SODIUM LACTATE, POTASSIUM CHLORIDE, CALCIUM CHLORIDE 600; 310; 30; 20 MG/100ML; MG/100ML; MG/100ML; MG/100ML
INJECTION, SOLUTION INTRAVENOUS CONTINUOUS
Status: CANCELLED | OUTPATIENT
Start: 2025-08-26

## 2025-08-26 RX ORDER — IPRATROPIUM BROMIDE AND ALBUTEROL SULFATE 2.5; .5 MG/3ML; MG/3ML
1 SOLUTION RESPIRATORY (INHALATION)
Status: CANCELLED | OUTPATIENT
Start: 2025-08-26

## 2025-08-26 RX ORDER — LIDOCAINE HYDROCHLORIDE 10 MG/ML
1 INJECTION, SOLUTION INFILTRATION; PERINEURAL
Status: CANCELLED | OUTPATIENT
Start: 2025-08-26

## 2025-08-27 ENCOUNTER — HOSPITAL ENCOUNTER (OUTPATIENT)
Age: 85
Discharge: HOME | End: 2025-08-27
Attending: INTERNAL MEDICINE | Admitting: INTERNAL MEDICINE
Payer: MEDICARE

## 2025-08-27 ENCOUNTER — ANESTHESIA (OUTPATIENT)
Dept: ENDOSCOPY | Age: 85
End: 2025-08-27
Payer: MEDICARE

## 2025-08-27 VITALS
HEART RATE: 80 BPM | HEIGHT: 64 IN | WEIGHT: 144 LBS | BODY MASS INDEX: 24.59 KG/M2 | OXYGEN SATURATION: 95 % | RESPIRATION RATE: 31 BRPM | SYSTOLIC BLOOD PRESSURE: 138 MMHG | DIASTOLIC BLOOD PRESSURE: 65 MMHG | TEMPERATURE: 98.6 F

## 2025-08-27 DIAGNOSIS — Z87.19 HISTORY OF BARRETT'S ESOPHAGUS: ICD-10-CM

## 2025-08-27 DIAGNOSIS — D50.9 IRON DEFICIENCY ANEMIA, UNSPECIFIED: ICD-10-CM

## 2025-08-27 PROCEDURE — 3609012400 HC EGD TRANSORAL BIOPSY SINGLE/MULTIPLE: Performed by: INTERNAL MEDICINE

## 2025-08-27 PROCEDURE — 2580000003 HC RX 258: Performed by: REGISTERED NURSE

## 2025-08-27 PROCEDURE — 7100000011 HC PHASE II RECOVERY - ADDTL 15 MIN: Performed by: INTERNAL MEDICINE

## 2025-08-27 PROCEDURE — 3609010200 HC COLONOSCOPY ABLATION TUMOR POLYP/OTHER LES: Performed by: INTERNAL MEDICINE

## 2025-08-27 PROCEDURE — 6360000002 HC RX W HCPCS: Performed by: REGISTERED NURSE

## 2025-08-27 PROCEDURE — 88305 TISSUE EXAM BY PATHOLOGIST: CPT

## 2025-08-27 PROCEDURE — 3700000001 HC ADD 15 MINUTES (ANESTHESIA): Performed by: INTERNAL MEDICINE

## 2025-08-27 PROCEDURE — 2500000003 HC RX 250 WO HCPCS: Performed by: REGISTERED NURSE

## 2025-08-27 PROCEDURE — 88342 IMHCHEM/IMCYTCHM 1ST ANTB: CPT

## 2025-08-27 PROCEDURE — 2709999900 HC NON-CHARGEABLE SUPPLY: Performed by: INTERNAL MEDICINE

## 2025-08-27 PROCEDURE — 7100000010 HC PHASE II RECOVERY - FIRST 15 MIN: Performed by: INTERNAL MEDICINE

## 2025-08-27 PROCEDURE — 3700000000 HC ANESTHESIA ATTENDED CARE: Performed by: INTERNAL MEDICINE

## 2025-08-27 RX ORDER — SODIUM CHLORIDE, SODIUM LACTATE, POTASSIUM CHLORIDE, CALCIUM CHLORIDE 600; 310; 30; 20 MG/100ML; MG/100ML; MG/100ML; MG/100ML
INJECTION, SOLUTION INTRAVENOUS
Status: DISCONTINUED | OUTPATIENT
Start: 2025-08-27 | End: 2025-08-27 | Stop reason: SDUPTHER

## 2025-08-27 RX ORDER — PROPOFOL 10 MG/ML
INJECTION, EMULSION INTRAVENOUS
Status: DISCONTINUED | OUTPATIENT
Start: 2025-08-27 | End: 2025-08-27 | Stop reason: SDUPTHER

## 2025-08-27 RX ORDER — DEXMEDETOMIDINE HYDROCHLORIDE 100 UG/ML
INJECTION, SOLUTION INTRAVENOUS
Status: DISCONTINUED | OUTPATIENT
Start: 2025-08-27 | End: 2025-08-27 | Stop reason: SDUPTHER

## 2025-08-27 RX ORDER — LIDOCAINE HYDROCHLORIDE 20 MG/ML
INJECTION, SOLUTION EPIDURAL; INFILTRATION; INTRACAUDAL; PERINEURAL
Status: DISCONTINUED | OUTPATIENT
Start: 2025-08-27 | End: 2025-08-27 | Stop reason: SDUPTHER

## 2025-08-27 RX ADMIN — LIDOCAINE HYDROCHLORIDE 50 MG: 20 INJECTION, SOLUTION EPIDURAL; INFILTRATION; INTRACAUDAL; PERINEURAL at 10:30

## 2025-08-27 RX ADMIN — SODIUM CHLORIDE, SODIUM LACTATE, POTASSIUM CHLORIDE, AND CALCIUM CHLORIDE: 600; 310; 30; 20 INJECTION, SOLUTION INTRAVENOUS at 10:26

## 2025-08-27 RX ADMIN — PROPOFOL 30 MG: 10 INJECTION, EMULSION INTRAVENOUS at 10:30

## 2025-08-27 RX ADMIN — DEXMEDETOMIDINE 8 MCG: 100 INJECTION, SOLUTION, CONCENTRATE INTRAVENOUS at 10:30

## 2025-08-27 RX ADMIN — DEXMEDETOMIDINE 4 MCG: 100 INJECTION, SOLUTION, CONCENTRATE INTRAVENOUS at 10:42

## 2025-08-27 RX ADMIN — PROPOFOL 120 MCG/KG/MIN: 10 INJECTION, EMULSION INTRAVENOUS at 10:31

## 2025-08-27 RX ADMIN — DEXMEDETOMIDINE 8 MCG: 100 INJECTION, SOLUTION, CONCENTRATE INTRAVENOUS at 10:35

## 2025-08-27 ASSESSMENT — PAIN - FUNCTIONAL ASSESSMENT: PAIN_FUNCTIONAL_ASSESSMENT: 0-10

## 2025-08-27 ASSESSMENT — PAIN SCALES - GENERAL: PAINLEVEL_OUTOF10: 0

## 2025-08-28 ENCOUNTER — OFFICE VISIT (OUTPATIENT)
Dept: ORTHOPEDIC SURGERY | Age: 85
End: 2025-08-28

## 2025-08-28 DIAGNOSIS — M17.12 PRIMARY OSTEOARTHRITIS OF LEFT KNEE: Primary | ICD-10-CM

## 2025-08-28 PROCEDURE — PREOPEXAM PRE-OP EXAM: Performed by: PHYSICIAN ASSISTANT

## 2025-08-28 RX ORDER — MELOXICAM 7.5 MG/1
7.5-15 TABLET ORAL DAILY
Qty: 60 TABLET | Refills: 1 | Status: SHIPPED | OUTPATIENT
Start: 2025-09-03

## 2025-08-28 RX ORDER — HYDROMORPHONE HYDROCHLORIDE 2 MG/1
2 TABLET ORAL EVERY 4 HOURS PRN
Qty: 30 TABLET | Refills: 0 | Status: SHIPPED | OUTPATIENT
Start: 2025-09-03 | End: 2025-09-08

## 2025-08-28 RX ORDER — PREDNISONE 5 MG/1
5 TABLET ORAL DAILY
Qty: 7 TABLET | Refills: 0 | Status: SHIPPED | OUTPATIENT
Start: 2025-09-03 | End: 2025-09-10

## 2025-08-28 RX ORDER — METHOCARBAMOL 750 MG/1
750 TABLET, FILM COATED ORAL 3 TIMES DAILY PRN
Qty: 40 TABLET | Refills: 1 | Status: SHIPPED | OUTPATIENT
Start: 2025-09-03

## 2025-08-28 RX ORDER — TRANEXAMIC ACID 650 MG/1
1300 TABLET ORAL DAILY
Qty: 6 TABLET | Refills: 0 | Status: SHIPPED | OUTPATIENT
Start: 2025-09-03 | End: 2025-09-06

## 2025-08-28 RX ORDER — ASPIRIN 81 MG/1
81 TABLET ORAL 2 TIMES DAILY
Qty: 70 TABLET | Refills: 0 | Status: SHIPPED | OUTPATIENT
Start: 2025-09-03 | End: 2025-10-08

## 2025-08-28 RX ORDER — PROMETHAZINE HYDROCHLORIDE 12.5 MG/1
12.5 TABLET ORAL 4 TIMES DAILY PRN
Qty: 20 TABLET | Refills: 2 | Status: SHIPPED | OUTPATIENT
Start: 2025-09-03

## 2025-09-03 ENCOUNTER — TELEPHONE (OUTPATIENT)
Dept: PULMONOLOGY | Age: 85
End: 2025-09-03

## 2025-09-04 ENCOUNTER — OFFICE VISIT (OUTPATIENT)
Dept: PULMONOLOGY | Age: 85
End: 2025-09-04
Payer: MEDICARE

## 2025-09-04 ENCOUNTER — TELEPHONE (OUTPATIENT)
Dept: PULMONOLOGY | Age: 85
End: 2025-09-04

## 2025-09-04 VITALS
DIASTOLIC BLOOD PRESSURE: 58 MMHG | SYSTOLIC BLOOD PRESSURE: 120 MMHG | HEART RATE: 72 BPM | RESPIRATION RATE: 14 BRPM | OXYGEN SATURATION: 94 % | BODY MASS INDEX: 24.07 KG/M2 | WEIGHT: 141 LBS | HEIGHT: 64 IN

## 2025-09-04 DIAGNOSIS — Z01.811 ENCOUNTER FOR PREOPERATIVE PULMONARY EXAMINATION: ICD-10-CM

## 2025-09-04 DIAGNOSIS — J44.1 COPD WITH ACUTE EXACERBATION (HCC): Primary | ICD-10-CM

## 2025-09-04 PROCEDURE — 1036F TOBACCO NON-USER: CPT | Performed by: STUDENT IN AN ORGANIZED HEALTH CARE EDUCATION/TRAINING PROGRAM

## 2025-09-04 PROCEDURE — 3074F SYST BP LT 130 MM HG: CPT | Performed by: STUDENT IN AN ORGANIZED HEALTH CARE EDUCATION/TRAINING PROGRAM

## 2025-09-04 PROCEDURE — 3078F DIAST BP <80 MM HG: CPT | Performed by: STUDENT IN AN ORGANIZED HEALTH CARE EDUCATION/TRAINING PROGRAM

## 2025-09-04 PROCEDURE — 1159F MED LIST DOCD IN RCRD: CPT | Performed by: STUDENT IN AN ORGANIZED HEALTH CARE EDUCATION/TRAINING PROGRAM

## 2025-09-04 PROCEDURE — G8420 CALC BMI NORM PARAMETERS: HCPCS | Performed by: STUDENT IN AN ORGANIZED HEALTH CARE EDUCATION/TRAINING PROGRAM

## 2025-09-04 PROCEDURE — G8399 PT W/DXA RESULTS DOCUMENT: HCPCS | Performed by: STUDENT IN AN ORGANIZED HEALTH CARE EDUCATION/TRAINING PROGRAM

## 2025-09-04 PROCEDURE — 1123F ACP DISCUSS/DSCN MKR DOCD: CPT | Performed by: STUDENT IN AN ORGANIZED HEALTH CARE EDUCATION/TRAINING PROGRAM

## 2025-09-04 PROCEDURE — 3023F SPIROM DOC REV: CPT | Performed by: STUDENT IN AN ORGANIZED HEALTH CARE EDUCATION/TRAINING PROGRAM

## 2025-09-04 PROCEDURE — 1090F PRES/ABSN URINE INCON ASSESS: CPT | Performed by: STUDENT IN AN ORGANIZED HEALTH CARE EDUCATION/TRAINING PROGRAM

## 2025-09-04 PROCEDURE — G8427 DOCREV CUR MEDS BY ELIG CLIN: HCPCS | Performed by: STUDENT IN AN ORGANIZED HEALTH CARE EDUCATION/TRAINING PROGRAM

## 2025-09-04 PROCEDURE — 99214 OFFICE O/P EST MOD 30 MIN: CPT | Performed by: STUDENT IN AN ORGANIZED HEALTH CARE EDUCATION/TRAINING PROGRAM

## 2025-09-04 RX ORDER — PREDNISONE 10 MG/1
TABLET ORAL
Qty: 30 TABLET | Refills: 0 | Status: SHIPPED | OUTPATIENT
Start: 2025-09-04

## 2025-09-04 RX ORDER — AZITHROMYCIN 250 MG/1
TABLET, FILM COATED ORAL
Qty: 6 TABLET | Refills: 0 | Status: SHIPPED | OUTPATIENT
Start: 2025-09-04 | End: 2025-09-14

## (undated) DEVICE — SUTURE NONABSORBABLE MONOFILAMENT 5-0 PS-2 18 IN BLK ETHILON 1666H

## (undated) DEVICE — SOLUTION IV 1000ML 0.9% SOD CHL

## (undated) DEVICE — PROBE 8225101 5PK STD PRASS FL TIP ROHS

## (undated) DEVICE — ENDOSCOPIC KIT 1.1+ OP4 CA DE 2 GWN AAMI LEVEL 3

## (undated) DEVICE — EMG TUBE 8229707 NIM TRIVANTAGE 7.0MM ID: Brand: NIM TRIVANTAGE™

## (undated) DEVICE — INSULATED BLADE ELECTRODE: Brand: EDGE

## (undated) DEVICE — BUTTON SWITCH PENCIL BLADE ELECTRODE, HOLSTER: Brand: EDGE

## (undated) DEVICE — 2000CC GUARDIAN II: Brand: GUARDIAN

## (undated) DEVICE — SPONGE: SPECIALTY PEANUT XR 100/CS: Brand: MEDICAL ACTION INDUSTRIES

## (undated) DEVICE — SUTURE PERMAHAND SZ 2-0 L12X18IN NONABSORBABLE BLK SILK A185H

## (undated) DEVICE — SYRINGE MED 10ML LUERLOCK TIP W/O SFTY DISP

## (undated) DEVICE — REM POLYHESIVE ADULT PATIENT RETURN ELECTRODE: Brand: VALLEYLAB

## (undated) DEVICE — LUBE JELLY FOIL PACK 1.4 OZ

## (undated) DEVICE — SYRINGE MEDICAL 3ML CLEAR PLASTIC STANDARD NON CONTROL LUERLOCK TIP DISPOSABLE

## (undated) DEVICE — SPONGE GZ W4XL4IN RAYON POLY CONSTRUCTED WV FINISHED EDGE

## (undated) DEVICE — SPONGE DISSECT PNUT SM 3/8IN -- 5/PK

## (undated) DEVICE — CONTAINER,SPECIMEN,O.R.STRL,4.5OZ: Brand: MEDLINE

## (undated) DEVICE — MANIFOLD SUCT SMK EVAC SGL PRT DISP NEPTUNE 2

## (undated) DEVICE — PAD,NON-ADHERENT,3X8,STERILE,LF,1/PK: Brand: MEDLINE

## (undated) DEVICE — MOUTHPIECE ENDOSCP L CTRL OPN AND SIDE PORTS DISP

## (undated) DEVICE — TRAY PREP DRY W/ PREM GLV 2 APPL 6 SPNG 2 UNDPD 1 OVERWRAP

## (undated) DEVICE — SUTURE PERMAHAND SZ 2-0 L18IN NONABSORBABLE BLK L26MM SH C012D

## (undated) DEVICE — SHEAR HARMONIC FOCUS OEM 9CM --

## (undated) DEVICE — HEAD AND NECK: Brand: MEDLINE INDUSTRIES, INC.

## (undated) DEVICE — CONTAINER FORMALIN PREFILLED 10% NBF 60ML

## (undated) DEVICE — SUT CHRMC 4-0 27IN RB1 BRN --

## (undated) DEVICE — ELECTRODE EKG 1 3/8X17/8IN SQ SHP AD FOAM BK SNAP CONN GEL

## (undated) DEVICE — SUTURE PERMAHAND SZ 2-0 L18IN NONABSORBABLE BLK L26MM PS 1588H

## (undated) DEVICE — TUBING O2 L7FT CRUSH RESIST

## (undated) DEVICE — DRAPE TWL SURG 16X26IN BLU ORB04] ALLCARE INC]

## (undated) DEVICE — BLOCK BITE AD 60FR W/ VELC STRP ADDRESSES MOST PT AND

## (undated) DEVICE — CONNECTOR TBNG OD5-7MM O2 END DISP

## (undated) DEVICE — NEEDLE SYRINGE 18GA L1.5IN RED PLAS HUB S STL BLNT FILL W/O

## (undated) DEVICE — FORCEPS BX L240CM JAW DIA2.8MM L CAP W/ NDL MIC MESH TOOTH

## (undated) DEVICE — Device

## (undated) DEVICE — AGENT HEMSTAT W2XL14IN OXIDIZED REGENERATED CELOS ABSRB FOR

## (undated) DEVICE — GOWN,REINF,POLY,ECL,PP SLV,XL: Brand: MEDLINE

## (undated) DEVICE — SOLUTION IRRIG 1000ML H2O PIC PLAS SHATTERPROOF CONTAINER

## (undated) DEVICE — BIPOLAR FORCEPS CORD: Brand: VALLEYLAB